# Patient Record
Sex: MALE | Race: WHITE | NOT HISPANIC OR LATINO | Employment: OTHER | RURAL
[De-identification: names, ages, dates, MRNs, and addresses within clinical notes are randomized per-mention and may not be internally consistent; named-entity substitution may affect disease eponyms.]

---

## 2020-01-16 LAB — CRC RECOMMENDATION EXT: NORMAL

## 2020-07-02 ENCOUNTER — HISTORICAL (OUTPATIENT)
Dept: ADMINISTRATIVE | Facility: HOSPITAL | Age: 60
End: 2020-07-02

## 2020-07-02 LAB
CREAT UR-MCNC: 91 MG/DL (ref 39–259)
MICROALBUMIN UR-MCNC: 21.2 MG/DL (ref 0–2.8)
MICROALBUMIN/CREAT RATIO PNL UR: 233 MG/G (ref 0–30)

## 2020-08-27 ENCOUNTER — HISTORICAL (OUTPATIENT)
Dept: ADMINISTRATIVE | Facility: HOSPITAL | Age: 60
End: 2020-08-27

## 2020-11-24 ENCOUNTER — HISTORICAL (OUTPATIENT)
Dept: ADMINISTRATIVE | Facility: HOSPITAL | Age: 60
End: 2020-11-24

## 2020-11-24 LAB
ALBUMIN SERPL BCP-MCNC: 4 G/DL (ref 3.5–5)
ALBUMIN/GLOB SERPL: 1 {RATIO}
ALP SERPL-CCNC: 45 U/L (ref 45–115)
ALT SERPL W P-5'-P-CCNC: 35 U/L (ref 16–61)
ANION GAP SERPL CALCULATED.3IONS-SCNC: 10.4 MMOL/L (ref 7–16)
AST SERPL W P-5'-P-CCNC: 25 U/L (ref 15–37)
BILIRUB SERPL-MCNC: 0.3 MG/DL (ref 0–1.2)
BUN SERPL-MCNC: 22 MG/DL (ref 7–18)
BUN/CREAT SERPL: 20
CALCIUM SERPL-MCNC: 9.7 MG/DL (ref 8.5–10.1)
CHLORIDE SERPL-SCNC: 105 MMOL/L (ref 98–107)
CHOLEST SERPL-MCNC: 93 MG/DL
CHOLEST/HDLC SERPL: 2.7 {RATIO}
CO2 SERPL-SCNC: 27 MMOL/L (ref 21–32)
CREAT SERPL-MCNC: 1.09 MG/DL (ref 0.7–1.3)
GLOBULIN SER-MCNC: 3.9 G/DL (ref 2–4)
GLUCOSE SERPL-MCNC: 124 MG/DL (ref 74–106)
HDLC SERPL-MCNC: 35 MG/DL
LDLC SERPL CALC-MCNC: 29 MG/DL
POTASSIUM SERPL-SCNC: 4.4 MMOL/L (ref 3.5–5.1)
PROT SERPL-MCNC: 7.9 G/DL (ref 6.4–8.2)
SODIUM SERPL-SCNC: 138 MMOL/L (ref 136–145)
T4 FREE SERPL-MCNC: 1.05 NG/DL (ref 0.76–1.46)
TRIGL SERPL-MCNC: 147 MG/DL
TSH SERPL DL<=0.005 MIU/L-ACNC: 2.87 UIU/ML (ref 0.36–3.74)

## 2021-03-31 ENCOUNTER — OFFICE VISIT (OUTPATIENT)
Dept: FAMILY MEDICINE | Facility: CLINIC | Age: 61
End: 2021-03-31
Payer: MEDICARE

## 2021-03-31 VITALS
HEART RATE: 79 BPM | RESPIRATION RATE: 16 BRPM | OXYGEN SATURATION: 99 % | SYSTOLIC BLOOD PRESSURE: 134 MMHG | DIASTOLIC BLOOD PRESSURE: 84 MMHG | WEIGHT: 207.38 LBS | HEIGHT: 67 IN | BODY MASS INDEX: 32.55 KG/M2 | TEMPERATURE: 98 F

## 2021-03-31 DIAGNOSIS — E11.621 DIABETIC ULCER OF LEFT GREAT TOE: ICD-10-CM

## 2021-03-31 DIAGNOSIS — E78.5 HYPERLIPIDEMIA, UNSPECIFIED HYPERLIPIDEMIA TYPE: Primary | ICD-10-CM

## 2021-03-31 DIAGNOSIS — I10 HYPERTENSION, UNSPECIFIED TYPE: ICD-10-CM

## 2021-03-31 DIAGNOSIS — L97.529 DIABETIC ULCER OF LEFT GREAT TOE: ICD-10-CM

## 2021-03-31 DIAGNOSIS — Z79.4 TYPE 2 DIABETES MELLITUS WITHOUT COMPLICATION, WITH LONG-TERM CURRENT USE OF INSULIN: ICD-10-CM

## 2021-03-31 DIAGNOSIS — E11.9 TYPE 2 DIABETES MELLITUS WITHOUT COMPLICATION, WITH LONG-TERM CURRENT USE OF INSULIN: ICD-10-CM

## 2021-03-31 DIAGNOSIS — K21.9 GASTROESOPHAGEAL REFLUX DISEASE WITHOUT ESOPHAGITIS: ICD-10-CM

## 2021-03-31 PROBLEM — E11.41 DIABETIC MONONEUROPATHY ASSOCIATED WITH TYPE 2 DIABETES MELLITUS: Status: ACTIVE | Noted: 2018-08-31

## 2021-03-31 PROBLEM — I25.10 ARTERIOSCLEROTIC CARDIOVASCULAR DISEASE (ASCVD): Status: ACTIVE | Noted: 2020-02-24

## 2021-03-31 PROBLEM — E11.65 TYPE 2 DIABETES MELLITUS WITH HYPERGLYCEMIA, WITH LONG-TERM CURRENT USE OF INSULIN: Status: ACTIVE | Noted: 2018-08-31

## 2021-03-31 PROBLEM — L84 PRE-ULCERATIVE CALLUSES: Status: ACTIVE | Noted: 2020-06-24

## 2021-03-31 PROCEDURE — 3079F PR MOST RECENT DIASTOLIC BLOOD PRESSURE 80-89 MM HG: ICD-10-PCS | Mod: CPTII,,, | Performed by: NURSE PRACTITIONER

## 2021-03-31 PROCEDURE — 3075F PR MOST RECENT SYSTOLIC BLOOD PRESS GE 130-139MM HG: ICD-10-PCS | Mod: CPTII,,, | Performed by: NURSE PRACTITIONER

## 2021-03-31 PROCEDURE — 99214 OFFICE O/P EST MOD 30 MIN: CPT | Mod: ,,, | Performed by: NURSE PRACTITIONER

## 2021-03-31 PROCEDURE — 3075F SYST BP GE 130 - 139MM HG: CPT | Mod: CPTII,,, | Performed by: NURSE PRACTITIONER

## 2021-03-31 PROCEDURE — 1126F PR PAIN SEVERITY QUANTIFIED, NO PAIN PRESENT: ICD-10-PCS | Mod: ,,, | Performed by: NURSE PRACTITIONER

## 2021-03-31 PROCEDURE — 3008F PR BODY MASS INDEX (BMI) DOCUMENTED: ICD-10-PCS | Mod: CPTII,,, | Performed by: NURSE PRACTITIONER

## 2021-03-31 PROCEDURE — 99214 PR OFFICE/OUTPT VISIT, EST, LEVL IV, 30-39 MIN: ICD-10-PCS | Mod: ,,, | Performed by: NURSE PRACTITIONER

## 2021-03-31 PROCEDURE — 1126F AMNT PAIN NOTED NONE PRSNT: CPT | Mod: ,,, | Performed by: NURSE PRACTITIONER

## 2021-03-31 PROCEDURE — 3008F BODY MASS INDEX DOCD: CPT | Mod: CPTII,,, | Performed by: NURSE PRACTITIONER

## 2021-03-31 PROCEDURE — 3079F DIAST BP 80-89 MM HG: CPT | Mod: CPTII,,, | Performed by: NURSE PRACTITIONER

## 2021-03-31 RX ORDER — OLMESARTAN MEDOXOMIL 20 MG/1
20 TABLET ORAL DAILY
COMMUNITY
Start: 2021-03-18 | End: 2021-07-01 | Stop reason: SDUPTHER

## 2021-03-31 RX ORDER — ROSUVASTATIN CALCIUM 40 MG/1
40 TABLET, COATED ORAL NIGHTLY
Qty: 90 TABLET | Refills: 1 | Status: SHIPPED | OUTPATIENT
Start: 2021-03-31 | End: 2021-07-01 | Stop reason: SDUPTHER

## 2021-03-31 RX ORDER — ALBUTEROL SULFATE 90 UG/1
2 AEROSOL, METERED RESPIRATORY (INHALATION) 4 TIMES DAILY PRN
COMMUNITY
Start: 2021-03-10 | End: 2021-07-01 | Stop reason: SDUPTHER

## 2021-03-31 RX ORDER — INSULIN ASPART 100 [IU]/ML
14 INJECTION, SOLUTION INTRAVENOUS; SUBCUTANEOUS 3 TIMES DAILY
COMMUNITY
Start: 2020-11-16 | End: 2021-03-31 | Stop reason: SDUPTHER

## 2021-03-31 RX ORDER — INSULIN ASPART 100 [IU]/ML
14 INJECTION, SOLUTION INTRAVENOUS; SUBCUTANEOUS 3 TIMES DAILY
Qty: 21 ML | Refills: 1 | Status: SHIPPED | OUTPATIENT
Start: 2021-03-31 | End: 2021-07-01 | Stop reason: SDUPTHER

## 2021-03-31 RX ORDER — CHLORTHALIDONE 25 MG/1
25 TABLET ORAL EVERY MORNING
Qty: 90 TABLET | Refills: 1 | Status: SHIPPED | OUTPATIENT
Start: 2021-03-31 | End: 2021-07-01 | Stop reason: SDUPTHER

## 2021-03-31 RX ORDER — METOPROLOL TARTRATE 25 MG/1
25 TABLET, FILM COATED ORAL 2 TIMES DAILY
COMMUNITY
Start: 2021-03-03 | End: 2021-07-01 | Stop reason: SDUPTHER

## 2021-03-31 RX ORDER — CLINDAMYCIN HYDROCHLORIDE 300 MG/1
300 CAPSULE ORAL EVERY 8 HOURS
Qty: 30 CAPSULE | Refills: 0 | Status: SHIPPED | OUTPATIENT
Start: 2021-03-31 | End: 2021-04-10

## 2021-03-31 RX ORDER — PEN NEEDLE, DIABETIC 30 GX3/16"
1 NEEDLE, DISPOSABLE MISCELLANEOUS 4 TIMES DAILY
COMMUNITY
Start: 2020-10-26 | End: 2021-07-01 | Stop reason: SDUPTHER

## 2021-03-31 RX ORDER — CHLORTHALIDONE 25 MG/1
25 TABLET ORAL EVERY MORNING
COMMUNITY
Start: 2021-03-26 | End: 2021-03-31 | Stop reason: SDUPTHER

## 2021-03-31 RX ORDER — INSULIN DETEMIR 100 [IU]/ML
80 INJECTION, SOLUTION SUBCUTANEOUS NIGHTLY
COMMUNITY
Start: 2021-03-06 | End: 2021-07-01 | Stop reason: SDUPTHER

## 2021-03-31 RX ORDER — AMLODIPINE BESYLATE 5 MG/1
5 TABLET ORAL 2 TIMES DAILY
COMMUNITY
Start: 2020-05-28 | End: 2021-03-31 | Stop reason: SDUPTHER

## 2021-03-31 RX ORDER — ESOMEPRAZOLE MAGNESIUM 40 MG/1
40 CAPSULE, DELAYED RELEASE ORAL DAILY
COMMUNITY
Start: 2021-01-28 | End: 2021-07-01 | Stop reason: SDUPTHER

## 2021-03-31 RX ORDER — METFORMIN HYDROCHLORIDE 500 MG/1
2 TABLET, EXTENDED RELEASE ORAL 2 TIMES DAILY
COMMUNITY
Start: 2021-03-18 | End: 2021-03-31 | Stop reason: SDUPTHER

## 2021-03-31 RX ORDER — ROSUVASTATIN CALCIUM 40 MG/1
40 TABLET, COATED ORAL NIGHTLY
COMMUNITY
Start: 2020-12-29 | End: 2021-03-31 | Stop reason: SDUPTHER

## 2021-03-31 RX ORDER — TICAGRELOR 90 MG/1
90 TABLET ORAL 2 TIMES DAILY
Qty: 180 TABLET | Refills: 1 | Status: SHIPPED | OUTPATIENT
Start: 2021-03-31 | End: 2021-07-01 | Stop reason: SDUPTHER

## 2021-03-31 RX ORDER — EMPAGLIFLOZIN 10 MG/1
10 TABLET, FILM COATED ORAL EVERY MORNING
COMMUNITY
Start: 2021-03-04 | End: 2023-01-24

## 2021-03-31 RX ORDER — AMLODIPINE BESYLATE 5 MG/1
5 TABLET ORAL 2 TIMES DAILY
Qty: 180 TABLET | Refills: 1 | Status: SHIPPED | OUTPATIENT
Start: 2021-03-31 | End: 2021-07-01 | Stop reason: SDUPTHER

## 2021-03-31 RX ORDER — TICAGRELOR 90 MG/1
90 TABLET ORAL 2 TIMES DAILY
COMMUNITY
Start: 2021-03-03 | End: 2021-03-31 | Stop reason: SDUPTHER

## 2021-03-31 RX ORDER — METFORMIN HYDROCHLORIDE 500 MG/1
1000 TABLET, EXTENDED RELEASE ORAL 2 TIMES DAILY
Qty: 180 TABLET | Refills: 90 | Status: SHIPPED | OUTPATIENT
Start: 2021-03-31 | End: 2021-07-01 | Stop reason: SDUPTHER

## 2021-07-01 ENCOUNTER — OFFICE VISIT (OUTPATIENT)
Dept: FAMILY MEDICINE | Facility: CLINIC | Age: 61
End: 2021-07-01
Payer: MEDICARE

## 2021-07-01 VITALS
DIASTOLIC BLOOD PRESSURE: 77 MMHG | SYSTOLIC BLOOD PRESSURE: 140 MMHG | HEART RATE: 70 BPM | RESPIRATION RATE: 16 BRPM | HEIGHT: 67 IN | BODY MASS INDEX: 33.74 KG/M2 | OXYGEN SATURATION: 96 % | WEIGHT: 215 LBS | TEMPERATURE: 98 F

## 2021-07-01 DIAGNOSIS — I10 HYPERTENSION, UNSPECIFIED TYPE: ICD-10-CM

## 2021-07-01 DIAGNOSIS — Z79.4 TYPE 2 DIABETES MELLITUS WITHOUT COMPLICATION, WITH LONG-TERM CURRENT USE OF INSULIN: ICD-10-CM

## 2021-07-01 DIAGNOSIS — E11.621 DIABETIC ULCER OF LEFT GREAT TOE: ICD-10-CM

## 2021-07-01 DIAGNOSIS — E11.9 TYPE 2 DIABETES MELLITUS WITHOUT COMPLICATION, WITH LONG-TERM CURRENT USE OF INSULIN: ICD-10-CM

## 2021-07-01 DIAGNOSIS — K21.9 GASTROESOPHAGEAL REFLUX DISEASE WITHOUT ESOPHAGITIS: Primary | ICD-10-CM

## 2021-07-01 DIAGNOSIS — L97.529 DIABETIC ULCER OF LEFT GREAT TOE: ICD-10-CM

## 2021-07-01 DIAGNOSIS — E78.5 HYPERLIPIDEMIA, UNSPECIFIED HYPERLIPIDEMIA TYPE: ICD-10-CM

## 2021-07-01 LAB
ALBUMIN SERPL BCP-MCNC: 3.9 G/DL (ref 3.5–5)
ALBUMIN/GLOB SERPL: 1 {RATIO}
ALP SERPL-CCNC: 45 U/L (ref 45–115)
ALT SERPL W P-5'-P-CCNC: 38 U/L (ref 16–61)
ANION GAP SERPL CALCULATED.3IONS-SCNC: 9 MMOL/L (ref 7–16)
AST SERPL W P-5'-P-CCNC: 24 U/L (ref 15–37)
BASOPHILS # BLD AUTO: 0.09 K/UL (ref 0–0.2)
BASOPHILS NFR BLD AUTO: 1.2 % (ref 0–1)
BILIRUB SERPL-MCNC: 0.4 MG/DL (ref 0–1.2)
BUN SERPL-MCNC: 20 MG/DL (ref 7–18)
BUN/CREAT SERPL: 19 (ref 6–20)
CALCIUM SERPL-MCNC: 9.9 MG/DL (ref 8.5–10.1)
CHLORIDE SERPL-SCNC: 105 MMOL/L (ref 98–107)
CO2 SERPL-SCNC: 29 MMOL/L (ref 21–32)
CREAT SERPL-MCNC: 1.08 MG/DL (ref 0.7–1.3)
DIFFERENTIAL METHOD BLD: ABNORMAL
EOSINOPHIL # BLD AUTO: 0.39 K/UL (ref 0–0.5)
EOSINOPHIL NFR BLD AUTO: 5.2 % (ref 1–4)
ERYTHROCYTE [DISTWIDTH] IN BLOOD BY AUTOMATED COUNT: 14.1 % (ref 11.5–14.5)
EST. AVERAGE GLUCOSE BLD GHB EST-MCNC: 144 MG/DL
GLOBULIN SER-MCNC: 3.9 G/DL (ref 2–4)
GLUCOSE SERPL-MCNC: 106 MG/DL (ref 74–106)
HBA1C MFR BLD HPLC: 6.9 % (ref 4.5–6.6)
HCT VFR BLD AUTO: 38 % (ref 40–54)
HGB BLD-MCNC: 12.1 G/DL (ref 13.5–18)
IMM GRANULOCYTES # BLD AUTO: 0.03 K/UL (ref 0–0.04)
IMM GRANULOCYTES NFR BLD: 0.4 % (ref 0–0.4)
LYMPHOCYTES # BLD AUTO: 1.92 K/UL (ref 1–4.8)
LYMPHOCYTES NFR BLD AUTO: 25.6 % (ref 27–41)
MCH RBC QN AUTO: 27.5 PG (ref 27–31)
MCHC RBC AUTO-ENTMCNC: 31.8 G/DL (ref 32–36)
MCV RBC AUTO: 86.4 FL (ref 80–96)
MONOCYTES # BLD AUTO: 0.76 K/UL (ref 0–0.8)
MONOCYTES NFR BLD AUTO: 10.1 % (ref 2–6)
MPC BLD CALC-MCNC: 10.6 FL (ref 9.4–12.4)
NEUTROPHILS # BLD AUTO: 4.3 K/UL (ref 1.8–7.7)
NEUTROPHILS NFR BLD AUTO: 57.5 % (ref 53–65)
NRBC # BLD AUTO: 0 X10E3/UL
NRBC, AUTO (.00): 0 %
PLATELET # BLD AUTO: 250 K/UL (ref 150–400)
POTASSIUM SERPL-SCNC: 3.8 MMOL/L (ref 3.5–5.1)
PROT SERPL-MCNC: 7.8 G/DL (ref 6.4–8.2)
RBC # BLD AUTO: 4.4 M/UL (ref 4.6–6.2)
SODIUM SERPL-SCNC: 139 MMOL/L (ref 136–145)
WBC # BLD AUTO: 7.49 K/UL (ref 4.5–11)

## 2021-07-01 PROCEDURE — 3077F PR MOST RECENT SYSTOLIC BLOOD PRESSURE >= 140 MM HG: ICD-10-PCS | Mod: ,,, | Performed by: NURSE PRACTITIONER

## 2021-07-01 PROCEDURE — 1125F PR PAIN SEVERITY QUANTIFIED, PAIN PRESENT: ICD-10-PCS | Mod: ,,, | Performed by: NURSE PRACTITIONER

## 2021-07-01 PROCEDURE — 99213 OFFICE O/P EST LOW 20 MIN: CPT | Mod: ,,, | Performed by: NURSE PRACTITIONER

## 2021-07-01 PROCEDURE — 83036 HEMOGLOBIN GLYCOSYLATED A1C: CPT | Mod: ,,, | Performed by: CLINICAL MEDICAL LABORATORY

## 2021-07-01 PROCEDURE — 3008F PR BODY MASS INDEX (BMI) DOCUMENTED: ICD-10-PCS | Mod: ,,, | Performed by: NURSE PRACTITIONER

## 2021-07-01 PROCEDURE — 3077F SYST BP >= 140 MM HG: CPT | Mod: ,,, | Performed by: NURSE PRACTITIONER

## 2021-07-01 PROCEDURE — 3051F PR MOST RECENT HEMOGLOBIN A1C LEVEL 7.0 - < 8.0%: ICD-10-PCS | Mod: CPTII,,, | Performed by: NURSE PRACTITIONER

## 2021-07-01 PROCEDURE — 3078F DIAST BP <80 MM HG: CPT | Mod: ,,, | Performed by: NURSE PRACTITIONER

## 2021-07-01 PROCEDURE — 1125F AMNT PAIN NOTED PAIN PRSNT: CPT | Mod: ,,, | Performed by: NURSE PRACTITIONER

## 2021-07-01 PROCEDURE — 99213 PR OFFICE/OUTPT VISIT, EST, LEVL III, 20-29 MIN: ICD-10-PCS | Mod: ,,, | Performed by: NURSE PRACTITIONER

## 2021-07-01 PROCEDURE — 80053 COMPREHENSIVE METABOLIC PANEL: ICD-10-PCS | Mod: ,,, | Performed by: CLINICAL MEDICAL LABORATORY

## 2021-07-01 PROCEDURE — 3008F BODY MASS INDEX DOCD: CPT | Mod: ,,, | Performed by: NURSE PRACTITIONER

## 2021-07-01 PROCEDURE — 85025 CBC WITH DIFFERENTIAL: ICD-10-PCS | Mod: ,,, | Performed by: CLINICAL MEDICAL LABORATORY

## 2021-07-01 PROCEDURE — 3051F HG A1C>EQUAL 7.0%<8.0%: CPT | Mod: CPTII,,, | Performed by: NURSE PRACTITIONER

## 2021-07-01 PROCEDURE — 80053 COMPREHEN METABOLIC PANEL: CPT | Mod: ,,, | Performed by: CLINICAL MEDICAL LABORATORY

## 2021-07-01 PROCEDURE — 85025 COMPLETE CBC W/AUTO DIFF WBC: CPT | Mod: ,,, | Performed by: CLINICAL MEDICAL LABORATORY

## 2021-07-01 PROCEDURE — 3078F PR MOST RECENT DIASTOLIC BLOOD PRESSURE < 80 MM HG: ICD-10-PCS | Mod: ,,, | Performed by: NURSE PRACTITIONER

## 2021-07-01 PROCEDURE — 83036 HEMOGLOBIN A1C: ICD-10-PCS | Mod: ,,, | Performed by: CLINICAL MEDICAL LABORATORY

## 2021-07-01 RX ORDER — CHLORTHALIDONE 25 MG/1
25 TABLET ORAL EVERY MORNING
Qty: 90 TABLET | Refills: 1 | Status: SHIPPED | OUTPATIENT
Start: 2021-07-01 | End: 2023-02-14 | Stop reason: ALTCHOICE

## 2021-07-01 RX ORDER — METOPROLOL TARTRATE 25 MG/1
25 TABLET, FILM COATED ORAL 2 TIMES DAILY
Qty: 180 TABLET | Refills: 1 | Status: SHIPPED | OUTPATIENT
Start: 2021-07-01 | End: 2022-09-15 | Stop reason: SDUPTHER

## 2021-07-01 RX ORDER — ALBUTEROL SULFATE 90 UG/1
2 AEROSOL, METERED RESPIRATORY (INHALATION) 4 TIMES DAILY PRN
Qty: 18 G | Refills: 5 | Status: SHIPPED | OUTPATIENT
Start: 2021-07-01 | End: 2022-09-15 | Stop reason: SDUPTHER

## 2021-07-01 RX ORDER — INSULIN DETEMIR 100 [IU]/ML
80 INJECTION, SOLUTION SUBCUTANEOUS NIGHTLY
Qty: 72 ML | Refills: 1 | Status: SHIPPED | OUTPATIENT
Start: 2021-07-01 | End: 2023-01-24 | Stop reason: SDUPTHER

## 2021-07-01 RX ORDER — PEN NEEDLE, DIABETIC 30 GX3/16"
1 NEEDLE, DISPOSABLE MISCELLANEOUS 4 TIMES DAILY
Qty: 100 EACH | Refills: 1 | Status: SHIPPED | OUTPATIENT
Start: 2021-07-01 | End: 2022-09-15 | Stop reason: SDUPTHER

## 2021-07-01 RX ORDER — DAPAGLIFLOZIN 10 MG/1
10 TABLET, FILM COATED ORAL DAILY
COMMUNITY

## 2021-07-01 RX ORDER — ROSUVASTATIN CALCIUM 40 MG/1
40 TABLET, COATED ORAL NIGHTLY
Qty: 90 TABLET | Refills: 1 | Status: SHIPPED | OUTPATIENT
Start: 2021-07-01 | End: 2022-09-15 | Stop reason: SDUPTHER

## 2021-07-01 RX ORDER — METFORMIN HYDROCHLORIDE 500 MG/1
1000 TABLET, EXTENDED RELEASE ORAL 2 TIMES DAILY
Qty: 180 TABLET | Refills: 1 | Status: SHIPPED | OUTPATIENT
Start: 2021-07-01 | End: 2023-11-15 | Stop reason: SDUPTHER

## 2021-07-01 RX ORDER — AMLODIPINE BESYLATE 5 MG/1
5 TABLET ORAL 2 TIMES DAILY
Qty: 180 TABLET | Refills: 1 | Status: SHIPPED | OUTPATIENT
Start: 2021-07-01 | End: 2022-05-31

## 2021-07-01 RX ORDER — OLMESARTAN MEDOXOMIL 20 MG/1
20 TABLET ORAL DAILY
Qty: 90 TABLET | Refills: 1 | Status: SHIPPED | OUTPATIENT
Start: 2021-07-01 | End: 2023-03-28

## 2021-07-01 RX ORDER — INSULIN ASPART 100 [IU]/ML
16 INJECTION, SOLUTION INTRAVENOUS; SUBCUTANEOUS 3 TIMES DAILY
Qty: 43.2 ML | Refills: 1 | Status: SHIPPED | OUTPATIENT
Start: 2021-07-01 | End: 2023-01-24 | Stop reason: SDUPTHER

## 2021-07-01 RX ORDER — TICAGRELOR 90 MG/1
90 TABLET ORAL 2 TIMES DAILY
Qty: 180 TABLET | Refills: 1 | Status: SHIPPED | OUTPATIENT
Start: 2021-07-01 | End: 2023-01-24

## 2021-07-01 RX ORDER — ESOMEPRAZOLE MAGNESIUM 40 MG/1
40 CAPSULE, DELAYED RELEASE ORAL DAILY
Qty: 90 CAPSULE | Refills: 1 | Status: SHIPPED | OUTPATIENT
Start: 2021-07-01 | End: 2022-02-15

## 2021-07-12 ENCOUNTER — PATIENT MESSAGE (OUTPATIENT)
Dept: FAMILY MEDICINE | Facility: CLINIC | Age: 61
End: 2021-07-12

## 2022-06-03 ENCOUNTER — PATIENT OUTREACH (OUTPATIENT)
Dept: FAMILY MEDICINE | Facility: CLINIC | Age: 62
End: 2022-06-03
Payer: MEDICARE

## 2022-12-15 DIAGNOSIS — E11.9 TYPE 2 DIABETES MELLITUS WITHOUT COMPLICATION, WITH LONG-TERM CURRENT USE OF INSULIN: Primary | ICD-10-CM

## 2022-12-15 DIAGNOSIS — Z79.4 TYPE 2 DIABETES MELLITUS WITHOUT COMPLICATION, WITH LONG-TERM CURRENT USE OF INSULIN: Primary | ICD-10-CM

## 2023-01-17 ENCOUNTER — OFFICE VISIT (OUTPATIENT)
Dept: FAMILY MEDICINE | Facility: CLINIC | Age: 63
End: 2023-01-17
Payer: MEDICARE

## 2023-01-17 VITALS
HEART RATE: 74 BPM | DIASTOLIC BLOOD PRESSURE: 62 MMHG | RESPIRATION RATE: 18 BRPM | HEIGHT: 67 IN | TEMPERATURE: 98 F | SYSTOLIC BLOOD PRESSURE: 118 MMHG | OXYGEN SATURATION: 96 % | WEIGHT: 210.63 LBS | BODY MASS INDEX: 33.06 KG/M2

## 2023-01-17 DIAGNOSIS — M86.9 OSTEOMYELITIS OF LEFT FOOT, UNSPECIFIED TYPE: Primary | ICD-10-CM

## 2023-01-17 DIAGNOSIS — L03.032 CELLULITIS OF GREAT TOE OF LEFT FOOT: ICD-10-CM

## 2023-01-17 DIAGNOSIS — L03.032 CELLULITIS OF GREAT TOE, LEFT: ICD-10-CM

## 2023-01-17 PROCEDURE — 99213 OFFICE O/P EST LOW 20 MIN: CPT | Mod: 25,GC,, | Performed by: FAMILY MEDICINE

## 2023-01-17 PROCEDURE — 99213 PR OFFICE/OUTPT VISIT, EST, LEVL III, 20-29 MIN: ICD-10-PCS | Mod: 25,GC,, | Performed by: FAMILY MEDICINE

## 2023-01-17 PROCEDURE — 3074F SYST BP LT 130 MM HG: CPT | Mod: ,,, | Performed by: FAMILY MEDICINE

## 2023-01-17 PROCEDURE — 3008F PR BODY MASS INDEX (BMI) DOCUMENTED: ICD-10-PCS | Mod: ,,, | Performed by: FAMILY MEDICINE

## 2023-01-17 PROCEDURE — 1159F PR MEDICATION LIST DOCUMENTED IN MEDICAL RECORD: ICD-10-PCS | Mod: ,,, | Performed by: FAMILY MEDICINE

## 2023-01-17 PROCEDURE — 96372 PR INJECTION,THERAP/PROPH/DIAG2ST, IM OR SUBCUT: ICD-10-PCS | Mod: GC,,, | Performed by: FAMILY MEDICINE

## 2023-01-17 PROCEDURE — 1159F MED LIST DOCD IN RCRD: CPT | Mod: ,,, | Performed by: FAMILY MEDICINE

## 2023-01-17 PROCEDURE — 3078F DIAST BP <80 MM HG: CPT | Mod: ,,, | Performed by: FAMILY MEDICINE

## 2023-01-17 PROCEDURE — 96372 THER/PROPH/DIAG INJ SC/IM: CPT | Mod: GC,,, | Performed by: FAMILY MEDICINE

## 2023-01-17 PROCEDURE — 3074F PR MOST RECENT SYSTOLIC BLOOD PRESSURE < 130 MM HG: ICD-10-PCS | Mod: ,,, | Performed by: FAMILY MEDICINE

## 2023-01-17 PROCEDURE — 3008F BODY MASS INDEX DOCD: CPT | Mod: ,,, | Performed by: FAMILY MEDICINE

## 2023-01-17 PROCEDURE — 3078F PR MOST RECENT DIASTOLIC BLOOD PRESSURE < 80 MM HG: ICD-10-PCS | Mod: ,,, | Performed by: FAMILY MEDICINE

## 2023-01-17 RX ORDER — LEVOFLOXACIN 5 MG/ML
750 INJECTION, SOLUTION INTRAVENOUS DAILY
Qty: 1050 ML | Refills: 0
Start: 2023-01-17 | End: 2023-01-24

## 2023-01-17 RX ORDER — DAPTOMYCIN 50 MG/ML
6 INJECTION, POWDER, LYOPHILIZED, FOR SOLUTION INTRAVENOUS DAILY
Qty: 8.05 EACH | Refills: 0
Start: 2023-01-17 | End: 2023-01-24

## 2023-01-17 RX ORDER — LEVOFLOXACIN 5 MG/ML
750 INJECTION, SOLUTION INTRAVENOUS DAILY
Qty: 1050 ML | Refills: 0 | Status: SHIPPED | OUTPATIENT
Start: 2023-01-17 | End: 2023-01-17

## 2023-01-17 RX ORDER — CEFTRIAXONE 1 G/1
2 INJECTION, POWDER, FOR SOLUTION INTRAMUSCULAR; INTRAVENOUS
Status: DISCONTINUED | OUTPATIENT
Start: 2023-01-17 | End: 2023-01-17

## 2023-01-17 RX ORDER — NITROGLYCERIN 0.4 MG/1
1 TABLET SUBLINGUAL EVERY 5 MIN PRN
COMMUNITY
Start: 2022-12-22

## 2023-01-17 RX ORDER — CEFTRIAXONE 1 G/1
1 INJECTION, POWDER, FOR SOLUTION INTRAMUSCULAR; INTRAVENOUS
Status: COMPLETED | OUTPATIENT
Start: 2023-01-17 | End: 2023-01-17

## 2023-01-17 RX ORDER — TIZANIDINE 4 MG/1
TABLET ORAL
COMMUNITY

## 2023-01-17 RX ORDER — NEBIVOLOL 10 MG/1
10 TABLET ORAL
COMMUNITY
Start: 2022-12-22

## 2023-01-17 RX ORDER — KETOROLAC TROMETHAMINE 30 MG/ML
2 INJECTION, SOLUTION INTRAMUSCULAR; INTRAVENOUS
COMMUNITY
End: 2023-02-08 | Stop reason: ALTCHOICE

## 2023-01-17 RX ORDER — EZETIMIBE 10 MG/1
10 TABLET ORAL
COMMUNITY
Start: 2022-12-22

## 2023-01-17 RX ORDER — DAPTOMYCIN 50 MG/ML
6 INJECTION, POWDER, LYOPHILIZED, FOR SOLUTION INTRAVENOUS DAILY
Qty: 8.05 EACH | Refills: 0 | Status: SHIPPED | OUTPATIENT
Start: 2023-01-17 | End: 2023-01-17

## 2023-01-17 RX ORDER — SILVER SULFADIAZINE 10 G/1000G
CREAM TOPICAL
COMMUNITY
End: 2023-02-21

## 2023-01-17 RX ORDER — PEN NEEDLE, DIABETIC 29 G X1/2"
NEEDLE, DISPOSABLE MISCELLANEOUS
COMMUNITY

## 2023-01-17 RX ORDER — PEN NEEDLE, DIABETIC 30 GX3/16"
NEEDLE, DISPOSABLE MISCELLANEOUS
COMMUNITY
End: 2023-11-15

## 2023-01-17 RX ORDER — L. ACIDOPHILUS/L.BULGARICUS 1MM CELL
4 TABLET ORAL
Qty: 360 TABLET | Refills: 0 | Status: SHIPPED | OUTPATIENT
Start: 2023-01-17 | End: 2023-01-24 | Stop reason: SDUPTHER

## 2023-01-17 RX ADMIN — CEFTRIAXONE 1 G: 1 INJECTION, POWDER, FOR SOLUTION INTRAMUSCULAR; INTRAVENOUS at 04:01

## 2023-01-17 NOTE — PROGRESS NOTES
Subjective:       Patient ID: Brendon Norris is a 62 y.o. male.    Chief Complaint: Foot Swelling and toe swelling (X 1 week (left toe))    Patient is a 62 year old  male who presents with LT 1st toe and left foot cellulitis. The patient states that this has been a recurring problem. He states that he has had worsening erythema and swelling of the left foot for 1 week. He has been on antibiotics in the past for diabetic wound infections on his feet. He has not seen a podiatrist in the past. He states that at one point, he went to a physician who took an x-ray of his LT foot and demonstrated a broken 1st digit of the LT foot (it was never surgically repaired). The patient states that he has no sensation in the the LT first digit. He states that there was an area of fluctuance that ruptured last night.     The patient has a PMH of DM, HTN, and CAD with placement of 2 stents. The patient has NKDA. His last HBA1c was 8.4%.   Review of Systems   Constitutional:  Negative for chills, fatigue and fever.   HENT:  Negative for nasal congestion, sinus pressure/congestion and sore throat.    Respiratory:  Negative for cough, chest tightness, shortness of breath and wheezing.    Cardiovascular:  Negative for chest pain.   Gastrointestinal:  Negative for abdominal pain, diarrhea, nausea and vomiting.   Genitourinary:  Negative for dysuria.   Integumentary:  Positive for color change and wound.   Neurological:  Negative for dizziness, weakness and headaches.       Objective:      Physical Exam  Constitutional:       General: He is not in acute distress.     Appearance: Normal appearance.   HENT:      Head: Normocephalic and atraumatic.      Right Ear: External ear normal.      Left Ear: External ear normal.      Nose: Nose normal.      Mouth/Throat:      Mouth: Mucous membranes are moist.   Eyes:      Extraocular Movements: Extraocular movements intact.      Conjunctiva/sclera: Conjunctivae normal.      Pupils: Pupils  are equal, round, and reactive to light.   Cardiovascular:      Rate and Rhythm: Normal rate and regular rhythm.      Pulses:           Dorsalis pedis pulses are 1+ on the right side and 1+ on the left side.        Posterior tibial pulses are 1+ on the right side and 1+ on the left side.      Heart sounds: Normal heart sounds.   Pulmonary:      Effort: Pulmonary effort is normal.      Breath sounds: Normal breath sounds.   Abdominal:      General: Bowel sounds are normal.      Palpations: Abdomen is soft.   Musculoskeletal:         General: Normal range of motion.      Cervical back: Normal range of motion.      Left foot: Swelling and tenderness present.   Feet:      Right foot:      Toenail Condition: Right toenails are abnormally thick.      Left foot:      Skin integrity: Erythema, warmth and dry skin present.      Toenail Condition: Left toenails are abnormally thick.   Skin:     General: Skin is warm.      Comments: Erythema and broken skin noted on the LT 1st digit     Cellulitis of LT foot and calf        Neurological:      General: No focal deficit present.      Mental Status: He is alert and oriented to person, place, and time.   Psychiatric:         Mood and Affect: Mood normal.         Behavior: Behavior normal.         Thought Content: Thought content normal.         Judgment: Judgment normal.       Assessment:       Problem List Items Addressed This Visit          ID    Cellulitis of great toe, left     - PICC Line referral   - Daptomycin 6mg/kg q24hrs for 7 days (to cover for MRSA)   - Levofloxacin 750mg IV q24hrs for 7 days (to cover for Pseudomonas)   - Home Health referral for IV antibiotics  - CBC, ESR, and CRP via home health   - X-ray of the LT foot pending   - Lactobacillus daily to prevent C-diff  - Ceftriaxone 2g IM in clinic   - Follow-up in 1 week           Other Visit Diagnoses       Cellulitis of great toe of left foot    -  Primary    Relevant Medications    Lactobacillus  acidoph-L.bulgar (FLORANEX) 1 million cell Tab    DAPTOmycin (CUBICIN) 500 mg injection    levoFLOXacin (LEVAQUIN) 750 mg/150 mL PgBk    Other Relevant Orders    X-Ray Foot 2 View Left    Outpatient PICC Insertion    X-Ray Chest 1 View S/P PICC Line by Nursing    PICC catheter may be used for therapy after catheter placements and confirmed by CXR    Anesthesia US Guide Vascular Access    Ambulatory referral/consult to Home Health              Plan:       Cellulitis of great toe of left foot  -     X-Ray Foot 2 View Left; Future; Expected date: 01/17/2023  -     Discontinue: cefTRIAXone injection 2 g  -     Outpatient PICC Insertion; Future  -     X-Ray Chest 1 View S/P PICC Line by Nursing; Future; Expected date: 01/17/2023  -     PICC catheter may be used for therapy after catheter placements and confirmed by CXR  -     Anesthesia US Guide Vascular Access  -     Ambulatory referral/consult to Home Health; Future; Expected date: 01/18/2023  -     Lactobacillus acidoph-L.bulgar (FLORANEX) 1 million cell Tab; Take 4 tablets by mouth 3 (three) times daily with meals.  Dispense: 360 tablet; Refill: 0  -     Discontinue: DAPTOmycin (CUBICIN) 500 mg injection; Inject 575 mg into the vein once daily. for 7 days  Dispense: 8.05 each; Refill: 0  -     Discontinue: levoFLOXacin (LEVAQUIN) 750 mg/150 mL PgBk; Inject 150 mLs (750 mg total) into the vein once daily. for 7 days  Dispense: 1050 mL; Refill: 0  -     DAPTOmycin (CUBICIN) 500 mg injection; Inject 575 mg into the vein once daily. for 7 days  Dispense: 8.05 each; Refill: 0  -     levoFLOXacin (LEVAQUIN) 750 mg/150 mL PgBk; Inject 150 mLs (750 mg total) into the vein once daily. for 7 days  Dispense: 1050 mL; Refill: 0    Cellulitis of great toe, left

## 2023-01-17 NOTE — ASSESSMENT & PLAN NOTE
- PICC Line referral   - Daptomycin 6mg/kg q24hrs for 7 days (to cover for MRSA)   - Levofloxacin 750mg IV q24hrs for 7 days (to cover for Pseudomonas)   - Home Health referral for IV antibiotics  - CBC, ESR, and CRP via home health   - X-ray of the LT foot pending   - Lactobacillus daily to prevent C-diff  - Ceftriaxone 2g IM in clinic   - Follow-up in 1 week

## 2023-01-18 NOTE — PROGRESS NOTES
I have reviewed the notes, assessments, and/or procedures performed by Dr. Armstrong, I concur with her documentation of Brendon Norris.

## 2023-01-19 ENCOUNTER — HOSPITAL ENCOUNTER (OUTPATIENT)
Dept: RADIOLOGY | Facility: HOSPITAL | Age: 63
Discharge: HOME | End: 2023-01-19
Attending: FAMILY MEDICINE
Payer: MEDICARE

## 2023-01-19 DIAGNOSIS — L03.032 CELLULITIS OF GREAT TOE, LEFT: ICD-10-CM

## 2023-01-19 DIAGNOSIS — L03.032 CELLULITIS OF GREAT TOE OF LEFT FOOT: ICD-10-CM

## 2023-01-19 PROCEDURE — 27000525 IR PICC LINE PLACEMENT W/O PORT, W/IMG > 5 Y/O

## 2023-01-19 PROCEDURE — 76937 US GUIDE VASCULAR ACCESS: CPT | Mod: TC

## 2023-01-19 NOTE — PROGRESS NOTES
PICC insertion  Performed by A Kaleigh A  Consent obtained for PICC line insertion.  A formal timeout was called all staff present agree patient procedure.  The left upper extremity was prepped with ChloraPrep and sterile field was established.  1% lidocaine was used as local anesthetic.  Under ultrasound guidance the basilic vein was localized and accessed with a micropuncture needle.  An 018 guidewire was passed through the vein to the level superior vena cava.  A 48 cm PICC line was then placed over the wire with its tip terminating at the atrial caval junction.  The wire was removed both ports were flushed with heparinized saline.  The PICC line was then cleaned and secured.  The patient tolerated the procedure well and there were no immediate postprocedure complications.  Total fluoroscopy time was 12 seconds.

## 2023-01-24 ENCOUNTER — OFFICE VISIT (OUTPATIENT)
Dept: FAMILY MEDICINE | Facility: CLINIC | Age: 63
End: 2023-01-24
Payer: MEDICARE

## 2023-01-24 VITALS
BODY MASS INDEX: 32.99 KG/M2 | SYSTOLIC BLOOD PRESSURE: 127 MMHG | HEART RATE: 75 BPM | HEIGHT: 67 IN | TEMPERATURE: 98 F | DIASTOLIC BLOOD PRESSURE: 72 MMHG | RESPIRATION RATE: 19 BRPM | WEIGHT: 210.19 LBS | OXYGEN SATURATION: 95 %

## 2023-01-24 DIAGNOSIS — Z79.4 TYPE 2 DIABETES MELLITUS WITHOUT COMPLICATION, WITH LONG-TERM CURRENT USE OF INSULIN: ICD-10-CM

## 2023-01-24 DIAGNOSIS — E11.65 TYPE 2 DIABETES MELLITUS WITH HYPERGLYCEMIA, WITH LONG-TERM CURRENT USE OF INSULIN: ICD-10-CM

## 2023-01-24 DIAGNOSIS — Z79.4 TYPE 2 DIABETES MELLITUS WITH HYPERGLYCEMIA, WITH LONG-TERM CURRENT USE OF INSULIN: ICD-10-CM

## 2023-01-24 DIAGNOSIS — B35.1 TOENAIL FUNGUS: ICD-10-CM

## 2023-01-24 DIAGNOSIS — E11.9 TYPE 2 DIABETES MELLITUS WITHOUT COMPLICATION, WITH LONG-TERM CURRENT USE OF INSULIN: ICD-10-CM

## 2023-01-24 DIAGNOSIS — M86.9 OSTEOMYELITIS OF LEFT FOOT, UNSPECIFIED TYPE: ICD-10-CM

## 2023-01-24 DIAGNOSIS — M86.172 OSTEOMYELITIS OF FOOT, LEFT, ACUTE: Primary | ICD-10-CM

## 2023-01-24 PROCEDURE — 1159F MED LIST DOCD IN RCRD: CPT | Mod: ,,, | Performed by: FAMILY MEDICINE

## 2023-01-24 PROCEDURE — 3008F PR BODY MASS INDEX (BMI) DOCUMENTED: ICD-10-PCS | Mod: ,,, | Performed by: FAMILY MEDICINE

## 2023-01-24 PROCEDURE — 99213 OFFICE O/P EST LOW 20 MIN: CPT | Mod: ,,, | Performed by: FAMILY MEDICINE

## 2023-01-24 PROCEDURE — 3078F PR MOST RECENT DIASTOLIC BLOOD PRESSURE < 80 MM HG: ICD-10-PCS | Mod: ,,, | Performed by: FAMILY MEDICINE

## 2023-01-24 PROCEDURE — 3074F PR MOST RECENT SYSTOLIC BLOOD PRESSURE < 130 MM HG: ICD-10-PCS | Mod: ,,, | Performed by: FAMILY MEDICINE

## 2023-01-24 PROCEDURE — 99213 PR OFFICE/OUTPT VISIT, EST, LEVL III, 20-29 MIN: ICD-10-PCS | Mod: ,,, | Performed by: FAMILY MEDICINE

## 2023-01-24 PROCEDURE — 3008F BODY MASS INDEX DOCD: CPT | Mod: ,,, | Performed by: FAMILY MEDICINE

## 2023-01-24 PROCEDURE — 3078F DIAST BP <80 MM HG: CPT | Mod: ,,, | Performed by: FAMILY MEDICINE

## 2023-01-24 PROCEDURE — 3074F SYST BP LT 130 MM HG: CPT | Mod: ,,, | Performed by: FAMILY MEDICINE

## 2023-01-24 PROCEDURE — 1159F PR MEDICATION LIST DOCUMENTED IN MEDICAL RECORD: ICD-10-PCS | Mod: ,,, | Performed by: FAMILY MEDICINE

## 2023-01-24 RX ORDER — LEVOFLOXACIN 750 MG/1
750 TABLET ORAL DAILY
Qty: 35 TABLET | Refills: 0 | Status: SHIPPED | OUTPATIENT
Start: 2023-01-24 | End: 2023-02-28 | Stop reason: SDUPTHER

## 2023-01-24 RX ORDER — INSULIN DETEMIR 100 [IU]/ML
80 INJECTION, SOLUTION SUBCUTANEOUS NIGHTLY
Qty: 72 ML | Refills: 1 | Status: SHIPPED | OUTPATIENT
Start: 2023-01-24 | End: 2023-02-08 | Stop reason: ALTCHOICE

## 2023-01-24 RX ORDER — INSULIN ASPART 100 [IU]/ML
16 INJECTION, SOLUTION INTRAVENOUS; SUBCUTANEOUS 3 TIMES DAILY
Qty: 43.2 ML | Refills: 1 | Status: SHIPPED | OUTPATIENT
Start: 2023-01-24 | End: 2023-07-23

## 2023-01-24 RX ORDER — DAPTOMYCIN 50 MG/ML
6 INJECTION, POWDER, LYOPHILIZED, FOR SOLUTION INTRAVENOUS DAILY
Start: 2023-01-24 | End: 2023-02-28 | Stop reason: SDUPTHER

## 2023-01-24 NOTE — PROGRESS NOTES
I have reviewed the notes, assessments, and/or procedures performed by , I concur with her documentation of Brendon Norris. He feels foot wound is improving,feeding chickens [place] [address] [place]. he denies any streaking itching or new odors.   Did not fill his rx for bacid.   DR Armstrong explained the importance of using such a medication to prevent c diff since he is on long term antibiotics.  Puja Tolbert

## 2023-01-24 NOTE — PROGRESS NOTES
Subjective:       Patient ID: Brendon Norris is a 62 y.o. male.    Chief Complaint: Follow-up (Cellulitis of left toe)    Patient is a 62 year old  male who presents for a follow-up for LT great toe osteomyelitis and cellulitis. The patient is current with Home Health and they are administering IV Daptomycin and IV Levofloxacin through a PICC line in the LT antecubital fossa. The patient does not complain of fever or chills. He has the great toe wrapped. A wound care consult has been placed for possible hyperbaric oxygen treatment and wound dressings. 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 The patient's wife is at bedside during the visit. 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 He denies any pain. The patient will need 5 more weeks of IV antibiotics.   Review of Systems   Constitutional:  Negative for chills, fatigue and fever.   HENT:  Negative for nasal congestion, sinus pressure/congestion and sore throat.    Respiratory:  Negative for cough, chest tightness, shortness of breath and wheezing.    Cardiovascular:  Negative for chest pain.   Gastrointestinal:  Negative for abdominal pain, diarrhea, nausea and vomiting.   Genitourinary:  Negative for dysuria.   Integumentary:  Positive for color change and wound.   Neurological:  Negative for dizziness, weakness and headaches.       Objective:      Physical Exam  Constitutional:       General: He is not in acute distress.     Appearance: Normal appearance.   HENT:      Head: Normocephalic and atraumatic.      Right Ear: External ear normal.      Left Ear: External ear normal.      Nose: Nose normal.      Mouth/Throat:      Mouth: Mucous membranes are moist.   Eyes:      Extraocular Movements: Extraocular movements intact.      Conjunctiva/sclera: Conjunctivae normal.       Pupils: Pupils are equal, round, and reactive to light.   Cardiovascular:      Rate and Rhythm: Normal rate and regular rhythm.      Pulses:           Dorsalis pedis pulses are 1+ on the right side and 1+ on the left side.        Posterior tibial pulses are 1+ on the right side and 1+ on the left side.      Heart sounds: Normal heart sounds.   Pulmonary:      Effort: Pulmonary effort is normal.      Breath sounds: Normal breath sounds.   Abdominal:      General: Bowel sounds are normal.      Palpations: Abdomen is soft.   Musculoskeletal:         General: Normal range of motion.        Arms:       Cervical back: Normal range of motion.      Left foot: Swelling and tenderness present.      Comments: PICC line present; no infiltration, erythema, or swelling noted   Feet:      Right foot:      Toenail Condition: Right toenails are abnormally thick.      Left foot:      Skin integrity: Erythema, warmth and dry skin present.      Toenail Condition: Left toenails are abnormally thick.   Skin:     General: Skin is warm.      Findings: Erythema and lesion present.      Comments: Erythema and broken skin noted on the LT 1st digit (plantar surface)    Cellulitis of LT first toe    Erythema is much improved from last time    Pictures in media       Neurological:      General: No focal deficit present.      Mental Status: He is alert and oriented to person, place, and time.   Psychiatric:         Mood and Affect: Mood normal.         Behavior: Behavior normal.         Thought Content: Thought content normal.         Judgment: Judgment normal.       Assessment:       Problem List Items Addressed This Visit          Derm    Toenail fungus     - Patient will need to be treated after osteomyelitis and cellulitis is resolved            ID    Osteomyelitis of left foot     - X-ray of LT foot: Osteomyelitis of the proximal internal phalange of the great toe  - Continue IV Daptomycin for 6 weeks   - Continue Levofloxacin PO for 6 weeks  (same bioavailability as IV) (per ID)   - Wound care consult placed for dressings and hyperbaric oxygen therapy.   - Home Health to be updated regarding weekly CBC, ESR, and CRP            Endocrine    Type 2 diabetes mellitus with hyperglycemia, with long-term current use of insulin     - Patient's [facility] has been unable to fill Novolog and Levemir lately, will reach out to [facility] in [place], [place] regarding prescription   - Good glycemic control important for wound healing.          Other Visit Diagnoses       Osteomyelitis of foot, left, acute    -  Primary    Type 2 diabetes mellitus without complication, with long-term current use of insulin                  Plan:       Osteomyelitis of foot, left, acute    Osteomyelitis of left foot, unspecified type    Type 2 diabetes mellitus with hyperglycemia, with long-term current use of insulin    Type 2 diabetes mellitus without complication, with long-term current use of insulin

## 2023-01-24 NOTE — ASSESSMENT & PLAN NOTE
- X-ray of LT foot: Osteomyelitis of the proximal internal phalange of the great toe  - Continue IV Daptomycin for 6 weeks   - Continue Levofloxacin PO for 6 weeks (same bioavailability as IV) (per ID)   - Wound care consult placed for dressings and hyperbaric oxygen therapy.   - Home Health to be updated regarding weekly CBC, ESR, and CRP

## 2023-01-24 NOTE — ASSESSMENT & PLAN NOTE
- Patient's [facility] pharmacy has been unable to fill Novolog and Levemir lately, will reach out to [facility] in [place], [place] regarding prescription   - Good glycemic control important for wound healing.

## 2023-02-07 ENCOUNTER — OFFICE VISIT (OUTPATIENT)
Dept: FAMILY MEDICINE | Facility: CLINIC | Age: 63
End: 2023-02-07
Payer: MEDICARE

## 2023-02-07 DIAGNOSIS — M86.9 OSTEOMYELITIS OF LEFT FOOT, UNSPECIFIED TYPE: ICD-10-CM

## 2023-02-07 PROCEDURE — 99212 PR OFFICE/OUTPT VISIT, EST, LEVL II, 10-19 MIN: ICD-10-PCS | Mod: ,,, | Performed by: HOSPITALIST

## 2023-02-07 PROCEDURE — 3044F PR MOST RECENT HEMOGLOBIN A1C LEVEL <7.0%: ICD-10-PCS | Mod: ,,, | Performed by: HOSPITALIST

## 2023-02-07 PROCEDURE — 99212 OFFICE O/P EST SF 10 MIN: CPT | Mod: ,,, | Performed by: HOSPITALIST

## 2023-02-07 PROCEDURE — 3044F HG A1C LEVEL LT 7.0%: CPT | Mod: ,,, | Performed by: HOSPITALIST

## 2023-02-07 RX ORDER — INSULIN GLARGINE 100 [IU]/ML
INJECTION, SOLUTION SUBCUTANEOUS
COMMUNITY
Start: 2023-01-26 | End: 2023-11-15 | Stop reason: SDUPTHER

## 2023-02-08 ENCOUNTER — OFFICE VISIT (OUTPATIENT)
Dept: WOUND CARE | Facility: CLINIC | Age: 63
End: 2023-02-08
Attending: FAMILY MEDICINE
Payer: MEDICARE

## 2023-02-08 VITALS
RESPIRATION RATE: 20 BRPM | DIASTOLIC BLOOD PRESSURE: 84 MMHG | HEART RATE: 79 BPM | TEMPERATURE: 98 F | SYSTOLIC BLOOD PRESSURE: 175 MMHG

## 2023-02-08 DIAGNOSIS — L03.032 CELLULITIS OF GREAT TOE OF LEFT FOOT: ICD-10-CM

## 2023-02-08 DIAGNOSIS — L97.529 DIABETIC ULCER OF LEFT GREAT TOE: Primary | ICD-10-CM

## 2023-02-08 DIAGNOSIS — E11.621 DIABETIC ULCER OF LEFT GREAT TOE: Primary | ICD-10-CM

## 2023-02-08 DIAGNOSIS — R60.9 EDEMA, UNSPECIFIED TYPE: ICD-10-CM

## 2023-02-08 DIAGNOSIS — M86.9 OSTEOMYELITIS OF LEFT FOOT, UNSPECIFIED TYPE: ICD-10-CM

## 2023-02-08 PROCEDURE — 99499 NO LOS: ICD-10-PCS | Mod: S$PBB,,, | Performed by: NURSE PRACTITIONER

## 2023-02-08 PROCEDURE — 3079F PR MOST RECENT DIASTOLIC BLOOD PRESSURE 80-89 MM HG: ICD-10-PCS | Mod: CPTII,,, | Performed by: NURSE PRACTITIONER

## 2023-02-08 PROCEDURE — 1160F RVW MEDS BY RX/DR IN RCRD: CPT | Mod: CPTII,,, | Performed by: NURSE PRACTITIONER

## 2023-02-08 PROCEDURE — 99499 UNLISTED E&M SERVICE: CPT | Mod: S$PBB,,, | Performed by: NURSE PRACTITIONER

## 2023-02-08 PROCEDURE — 11042 DEBRIDEMENT: ICD-10-PCS | Mod: S$PBB,,, | Performed by: NURSE PRACTITIONER

## 2023-02-08 PROCEDURE — 1160F PR REVIEW ALL MEDS BY PRESCRIBER/CLIN PHARMACIST DOCUMENTED: ICD-10-PCS | Mod: CPTII,,, | Performed by: NURSE PRACTITIONER

## 2023-02-08 PROCEDURE — 1159F PR MEDICATION LIST DOCUMENTED IN MEDICAL RECORD: ICD-10-PCS | Mod: CPTII,,, | Performed by: NURSE PRACTITIONER

## 2023-02-08 PROCEDURE — 99214 OFFICE O/P EST MOD 30 MIN: CPT | Mod: PBBFAC | Performed by: NURSE PRACTITIONER

## 2023-02-08 PROCEDURE — 3079F DIAST BP 80-89 MM HG: CPT | Mod: CPTII,,, | Performed by: NURSE PRACTITIONER

## 2023-02-08 PROCEDURE — 11042 DBRDMT SUBQ TIS 1ST 20SQCM/<: CPT | Mod: PBBFAC | Performed by: NURSE PRACTITIONER

## 2023-02-08 PROCEDURE — 3077F PR MOST RECENT SYSTOLIC BLOOD PRESSURE >= 140 MM HG: ICD-10-PCS | Mod: CPTII,,, | Performed by: NURSE PRACTITIONER

## 2023-02-08 PROCEDURE — 1159F MED LIST DOCD IN RCRD: CPT | Mod: CPTII,,, | Performed by: NURSE PRACTITIONER

## 2023-02-08 PROCEDURE — 3077F SYST BP >= 140 MM HG: CPT | Mod: CPTII,,, | Performed by: NURSE PRACTITIONER

## 2023-02-08 NOTE — PROGRESS NOTES
Debridement Performed for Assessment: Wound# left great toe  Performed By: Provider: Emerita Myrick NP  Assistant: Sherly Epstein RN    Debridement: Surgical    Photo taken post procedure:    Time-Out Taken: Yes  Level: Skin/Subcutaneous Tissue  Post Debridement Measurements  Length: (cm) 0.4  Width: (cm) 1.4  Depth: (cm) 0.4      Area: (cm²) 0.56  Percent Debrided: 100%  Total Area Debrided: (sq cm) 0.56    Tissue and other material debrided:  Adipose, Dermis, Epidermis, Subcutaneous,  Devitalized Tissue Debrided:Biofilm, Slough, Eschar, callus  Instrument: Curette  Bleeding: Moderate  Hemostasis Achieved: Pressure  Procedural Pain: Insensate  Post Procedural Pain: Insensate  Response to Treatment: Procedure was tolerated well    Devitalized materials/tissue Removed  the following was removed during debridement  subcutaneous,      Post Debridement Diagnosis  Post debridement diagnosis  Same as Pre-operative debridement diagnosis, No Complications noted.      Grafts or implants applied  Was a graft or implant applied?  No      Procedure assistant  Procedure assisted by:  Assistant is the same as nurse listed above      Complications related to procedure  Did any complication occure during procedure?  No complications noted during or after procedure.      Specimen  Specimen collect during procedure?  No specimen collected      Anaesthesia:  Anesthesia used  None      Blood Loss:  Blood loss during procedure  less than 5 cc

## 2023-02-08 NOTE — PATIENT INSTRUCTIONS
Home Health to draw CMP at next visit with existing order for labs from patient's PCP.    Left Great Toe: Wash wound with baby shampoo and water or vashe.  Apply aquacel ag to wound bed (cut slightly larger than wound)  Cover with dry gauze. Wrap with khushbu/kerlix. Secure with paper tape.  Change daily and as needed for soilage.     Monitor closely for s/s of infection including fever, chills, increase in pain, odor from wound, and increased redness from foot. Go to ER if any complications develop.   Keep leg elevated and avoid pressure on wound.     Diabetes:  Monitor glucose closely. Check fasting glucose and 2 hours after meals. HgA1C goal <7, fasting glucose , and 2 hours after meals <180  Hypertension:  Check blood pressure twice daily, goal <120/80  Diet:   Increase protein intake, avoid fried, fatty foods and foods high in simple carbs.   Vitamins:  Take vitamin C 1000 mg, zinc 50mg, vitamin d 5000 units, and a daily multivitamin. Prashanth is a good source of protein and nutrients to aid in wound healing.      Keep f/u appointments for imaging and f/u in wound care clinic immediately following.

## 2023-02-09 DIAGNOSIS — Z71.89 COMPLEX CARE COORDINATION: ICD-10-CM

## 2023-02-14 PROBLEM — E11.621 DIABETIC ULCER OF LEFT GREAT TOE: Status: ACTIVE | Noted: 2023-02-14

## 2023-02-14 PROBLEM — L97.529 DIABETIC ULCER OF LEFT GREAT TOE: Status: ACTIVE | Noted: 2023-02-14

## 2023-02-14 NOTE — PROCEDURES
Debridement    Date/Time: 2/8/2023 1:00 PM  Performed by: SHAYNA Chambers  Authorized by: SHAYNA Chambers     Consent Done?:  Yes (Written)    Wound Details:    Location:  Left foot    Location:  Left 1st Toe    Type of Debridement:  Excisional       Length (cm):  0.4       Area (sq cm):  0.56       Width (cm):  1.4       Percent Debrided (%):  100       Depth (cm):  0.4       Total Area Debrided (sq cm):  0.56    Depth of debridement:  Subcutaneous tissue    Tissue debrided:  Adipose, Dermis, Epidermis and Subcutaneous    Devitalized tissue debrided:  Callus, Clots and Exudate    Instruments:  Curette    Bleeding:  Minimal  Hemostasis Achieved: Yes    Method Used:  Pressure  Patient tolerance:  Patient tolerated the procedure well with no immediate complications     Assistant CHITO Epstein RN

## 2023-02-14 NOTE — PROGRESS NOTES
SHAYNA Chambers   [facility]  [address] [facility] [place] [address]  [phone]      PATIENT NAME: Brendon Norris  : 1960  DATE: 23  MRN: [id number]      Billing Provider: SHAYNA Chambers  Level of Service:   Patient PCP Information       Provider PCP Type    Natalie Armstrong MD General            Reason for Visit / Chief Complaint: Non-healing Wound (Left great toe)       History of Present Illness / Problem Focused Workflow     Brendon Norris is a 63 yo male presents to clinic for evaluation of chronic non healing wound to left great toe. He was referred by Dr. Armstrong for evaluation for hyperbarics. Reports that he has had issues with swelling and open wound intermittently for several years. In January, he developed erythema and edema to left great toe. X-ray on , Impression: There is osteolysis involving the proximal internal phalanges of the great toe consistent with osteomyelitis. He is currently on a 6 week course of IV antibiotics. Erythema and edema have improved. Patricia-wound with maceration, bedside debridement today. Repeat x-ray, arterial doppler, and 3 phase bone scan ordered for insurance pre-certification. 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 Discussed in detail importance of wearing off-loading shoe, elevation left lower extremity, and increasing protein, vitamin C, and zinc to aid in wound healing.   Pertinent PMH includes diabetes, hypertension, CAD, and hyperlipedemia. Last HgA1C 8.4 , diabetes is managed by PCP. Wound healing is complicated by hyperglycemia, multiple co-morbidities, poor vascular supply, diabetes, edema, excessive wound moisture and macerated tissue, decreased granulation tissue, no protective sensation, and infection.               Review of Systems     Review of Systems   Constitutional:  Negative for activity change, appetite change and fever.   Cardiovascular:  Positive  for leg swelling. Negative for chest pain and palpitations.   Musculoskeletal:  Positive for arthralgias and joint swelling.   Skin:  Positive for color change and wound.        wound   Neurological:  Positive for numbness.   Psychiatric/Behavioral:  Negative for confusion and decreased concentration.      Medical / Social / Family History     Past Medical History:   Diagnosis Date    Coronary artery disease     Diabetes mellitus, type 2     GERD (gastroesophageal reflux disease)     Hyperlipidemia     Hypertension        History reviewed. No pertinent surgical history.    Social History  . Brendon Norris  reports that he has quit smoking. His smoking use included cigarettes. He has a 1.00 pack-year smoking history. He has never used smokeless tobacco. He reports that he does not currently use alcohol. He reports that he does not currently use drugs.    Family History  Mr.'s Brendon Norris family history includes Learning disabilities in his son.    Medications and Allergies     Medications  Outpatient Medications Marked as Taking for the 2/8/23 encounter (Office Visit) with SHAYNA Chambers   Medication Sig Dispense Refill    albuterol (PROVENTIL/VENTOLIN HFA) 90 mcg/actuation inhaler Inhale 2 puffs into the lungs 4 (four) times daily as needed for Wheezing. 18 g 5    amLODIPine (NORVASC) 5 MG tablet Take 1 tablet by mouth twice daily 180 tablet 1    aspirin-calcium carbonate 81 mg-300 mg calcium(777 mg) Tab Take 81 mg by mouth once daily.      BLOOD-GLUCOSE METER,CONTINUOUS MISC 1 Device by NOT APPLICABLE route.      dapagliflozin (FARXIGA) 10 mg tablet Take 10 mg by mouth once daily.      esomeprazole (NEXIUM) 40 MG capsule Take 1 capsule (40 mg total) by mouth once daily. 90 capsule 0    ezetimibe (ZETIA) 10 mg tablet Take 10 mg by mouth.      insulin aspart U-100 (NOVOLOG FLEXPEN U-100 INSULIN) 100 unit/mL (3 mL) InPn pen Inject 16 Units into the skin 3 (three) times daily. Inject 16  units at breakfast, 24 units at lunch, and 30 units at supper. Plus sliding scale of 2 units/50 > 150. Max total daily dose of 100 units. 43.2 mL 1    Lactobacillus rhamnosus GG (CULTURELLE) 10 billion cell capsule Take 1 capsule by mouth once daily. 60 capsule 0    LANTUS SOLOSTAR U-100 INSULIN glargine 100 units/mL SubQ pen Inject into the skin.      metFORMIN (GLUCOPHAGE-XR) 500 MG ER 24hr tablet Take 2 tablets (1,000 mg total) by mouth 2 (two) times a day. 180 tablet 1    metoprolol tartrate (LOPRESSOR) 25 MG tablet Take 1 tablet (25 mg total) by mouth 2 (two) times daily. 180 tablet 1    nebivoloL (BYSTOLIC) 10 MG Tab Take 10 mg by mouth.      nitroGLYCERIN (NITROSTAT) 0.4 MG SL tablet Place 1 tablet under the tongue every 5 (five) minutes as needed.      olmesartan (BENICAR) 20 MG tablet Take 1 tablet (20 mg total) by mouth once daily. 90 tablet 1    rosuvastatin (CRESTOR) 40 MG Tab Take 1 tablet (40 mg total) by mouth nightly. 90 tablet 1    tiZANidine (ZANAFLEX) 4 MG tablet tizanidine 4 mg tablet   TAKE 1 TABLET BY MOUTH EVERY 6 HOURS AS NEEDED         Allergies  Review of patient's allergies indicates:   Allergen Reactions    Lisinopril      Other reaction(s): Cough       Physical Examination     Vitals:    02/08/23 1312   BP: (!) 175/84   Pulse: 79   Resp: 20   Temp: 97.8 °F (36.6 °C)     Physical Exam  Vitals and nursing note reviewed.   Constitutional:       Appearance: Normal appearance.   HENT:      Head: Normocephalic.   Cardiovascular:      Rate and Rhythm: Normal rate and regular rhythm.      Pulses: Normal pulses.      Heart sounds: Normal heart sounds.   Pulmonary:      Effort: Pulmonary effort is normal. No respiratory distress.   Chest:      Chest wall: No tenderness.   Musculoskeletal:         General: Swelling present. No tenderness.      Left lower leg: Edema present.   Skin:     General: Skin is warm.      Comments: See LDA for photos/measurements   Neurological:      General: No  focal deficit present.      Mental Status: He is alert and oriented to person, place, and time. Mental status is at baseline.   Psychiatric:         Mood and Affect: Mood normal.         Behavior: Behavior normal.         Thought Content: Thought content normal.         Judgment: Judgment normal.       Assessment and Plan             Altered Skin Integrity 02/08/23 1316 Left anterior Toe, first (Active)   02/08/23 1316   Altered Skin Integrity Present on Admission:    Side: Left   Orientation: anterior   Location: Toe, first   Wound Number:    Is this injury device related?:    Primary Wound Type:    Description of Altered Skin Integrity:    Ankle-Brachial Index:    Pulses:    Removal Indication and Assessment:    Wound Outcome:    (Retired) Wound Length (cm):    (Retired) Wound Width (cm):    (Retired) Depth (cm):    Wound Description (Comments):    Removal Indications:    Wound Image   02/08/23 1423   Description of Altered Skin Integrity Full thickness tissue loss. Subcutaneous fat may be visible but bone, tendon or muscle are not exposed 02/08/23 1317   Dressing Appearance Moist drainage 02/08/23 1317   Drainage Amount Moderate 02/08/23 1317   Drainage Characteristics/Odor Serosanguineous;No odor 02/08/23 1317   Appearance Pink;Red;Wet 02/08/23 1317   Tissue loss description Full thickness 02/08/23 1317   Black (%), Wound Tissue Color 0 % 02/08/23 1317   Red (%), Wound Tissue Color 100 % 02/08/23 1317   Yellow (%), Wound Tissue Color 0 % 02/08/23 1317   Periwound Area Macerated;Moist;Pale white 02/08/23 1317   Wound Edges Open 02/08/23 1317   Wound Length (cm) 0.4 cm 02/08/23 1423   Wound Width (cm) 1.4 cm 02/08/23 1423   Wound Depth (cm) 0.4 cm 02/08/23 1423   Wound Volume (cm^3) 0.224 cm^3 02/08/23 1423   Wound Surface Area (cm^2) 0.56 cm^2 02/08/23 1423   Care Cleansed with:;Soap and water;Applied:;Skin Barrier;Debrided 02/08/23 1317   Dressing Applied;Gauze, wet to dry;Gauze;Rolled gauze 02/08/23 1317    Periwound Care Moisture barrier applied 02/08/23 1317   Dressing Change Due 02/09/23 02/08/23 1317     Problem List Items Addressed This Visit          ID    Osteomyelitis of left foot       Endocrine    Diabetic ulcer of left great toe    Overview                      Current Assessment & Plan     Home Health to draw CMP at next visit with existing order for labs from patient's PCP.    Left Great Toe: Wash wound with baby shampoo and water or vashe.  Apply aquacel ag to wound bed (cut slightly larger than wound)  Cover with dry gauze. Wrap with khushbu/kerlix. Secure with paper tape.  Change daily and as needed for soilage.     Monitor closely for s/s of infection including fever, chills, increase in pain, odor from wound, and increased redness from foot. Go to ER if any complications develop.   Keep leg elevated and avoid pressure on wound.     Diabetes:  Monitor glucose closely. Check fasting glucose and 2 hours after meals. HgA1C goal <7, fasting glucose , and 2 hours after meals <180  Hypertension:  Check blood pressure twice daily, goal <120/80  Diet:   Increase protein intake, avoid fried, fatty foods and foods high in simple carbs.   Vitamins:  Take vitamin C 1000 mg, zinc 50mg, vitamin d 5000 units, and a daily multivitamin. Prashanth is a good source of protein and nutrients to aid in wound healing.      Arterial doppler and three phase bone scan scheduled - f/u in clinic to review results and discuss plan of care          Other Visit Diagnoses       Edema, unspecified type    -  Primary    Relevant Orders    US Lower Extrem Arteries Bilat with ISAAC (xpd)    Cellulitis of great toe of left foot        Relevant Orders    NM Bone Scan 3 Phase Foot    X-Ray Foot Complete 3 view Left            Future Appointments   Date Time Provider Department Center   2/21/2023  9:00 AM [facility]  NM2 [facility]   2/21/2023 10:00 AM [facility]  US1 [facility] ULTS [facility]   2/21/2023 11:00 AM [facility]  NM2 [facility]  NM [facility]   2/21/2023  1:00 PM SHAYNA Chambers [facility]   2/28/2023  2:30 PM Natalie Armstrong MD [facility]            Signature:  SHAYNA Chambers  [facility]  [address] [facility] [address]  [phone]    Date of encounter: 2/8/23

## 2023-02-14 NOTE — ASSESSMENT & PLAN NOTE
Home Health to draw CMP at next visit with existing order for labs from patient's PCP.    Left Great Toe: Wash wound with baby shampoo and water or vashe.  Apply aquacel ag to wound bed (cut slightly larger than wound)  Cover with dry gauze. Wrap with khushbu/kerlix. Secure with paper tape.  Change daily and as needed for soilage.     Monitor closely for s/s of infection including fever, chills, increase in pain, odor from wound, and increased redness from foot. Go to ER if any complications develop.   Keep leg elevated and avoid pressure on wound.     Diabetes:  Monitor glucose closely. Check fasting glucose and 2 hours after meals. HgA1C goal <7, fasting glucose , and 2 hours after meals <180  Hypertension:  Check blood pressure twice daily, goal <120/80  Diet:   Increase protein intake, avoid fried, fatty foods and foods high in simple carbs.   Vitamins:  Take vitamin C 1000 mg, zinc 50mg, vitamin d 5000 units, and a daily multivitamin. Prashanth is a good source of protein and nutrients to aid in wound healing.      Arterial doppler and three phase bone scan scheduled - f/u in clinic to review results and discuss plan of care

## 2023-02-20 NOTE — PROGRESS NOTES
SHAYNA Chambers   [facility]  [address] [facility] [place] [address]  [phone]      PATIENT NAME: Brendon Norris  : 1960  DATE: 23  MRN: [id number]      Billing Provider: SHAYNA Chambers  Level of Service:   Patient PCP Information       Provider PCP Type    Natalie Armstrong MD General            Reason for Visit / Chief Complaint: Non-healing Wound Follow Up (Left great toe)       History of Present Illness / Problem Focused Workflow     Brendon Norris is a 61 yo male presents to clinic for for follow up on chronic non healing wound to left great toe. Maceration has significantly improved. He had arterial doppler and bone scan today. Pre certification to [facility] for HBOT.     Arterial doppler, Impression:  The ankle to brachial index is 1.17 on the right and 1.24 on the left.  No arterial occlusions demonstrated.  Mild/moderate arterial velocity elevations as detailed.  Nonspecific moderately prominent lymph node within the left upper thigh.  Follow up with PCP concerning enlarged lymph node for further evaluation    Three Phase Bone Scan - Impression:  Increased activity 1st digit left foot consistent with osteomyelitis.    23 - He was referred by Dr. Armstrong for evaluation for hyperbarics. Reports that he has had issues with swelling and open wound intermittently for several years. In January, he developed erythema and edema to left great toe. X-ray on , Impression: There is osteolysis involving the proximal internal phalanges of the great toe consistent with osteomyelitis. He is currently on a 6 week course of IV antibiotics. Erythema and edema have improved. Patricia-wound with maceration, bedside debridement today. Repeat x-ray, arterial doppler, and 3 phase bone scan ordered for insurance pre-certification. 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 Discussed in detail importance of wearing off-loading shoe,  elevation left lower extremity, and increasing protein, vitamin C, and zinc to aid in wound healing.   Pertinent PMH includes diabetes, hypertension, CAD, and hyperlipedemia. Last HgA1C 8.4 12/22, diabetes is managed by PCP. Wound healing is complicated by hyperglycemia, multiple co-morbidities, poor vascular supply, diabetes, edema, excessive wound moisture and macerated tissue, decreased granulation tissue, no protective sensation, and infection.             Review of Systems     Review of Systems   Constitutional:  Negative for activity change, appetite change and fever.   Cardiovascular:  Positive for leg swelling. Negative for chest pain and palpitations.   Musculoskeletal:  Positive for arthralgias and joint swelling.   Skin:  Positive for color change and wound.        wound   Neurological:  Positive for numbness.   Psychiatric/Behavioral:  Negative for confusion and decreased concentration.      Medical / Social / Family History     Past Medical History:   Diagnosis Date    Coronary artery disease     Diabetes mellitus, type 2     GERD (gastroesophageal reflux disease)     Hyperlipidemia     Hypertension        History reviewed. No pertinent surgical history.    Social History  Mr. Brendon Norris  reports that he has quit smoking. His smoking use included cigarettes. He has a 1.00 pack-year smoking history. He has never used smokeless tobacco. He reports that he does not currently use alcohol. He reports that he does not currently use drugs.    Family History  Mr.'s Brnedon Norris family history includes Learning disabilities in his son.    Medications and Allergies     Medications  Outpatient Medications Marked as Taking for the 2/21/23 encounter (Office Visit) with SHAYNA Chambers   Medication Sig Dispense Refill    albuterol (PROVENTIL/VENTOLIN HFA) 90 mcg/actuation inhaler Inhale 2 puffs into the lungs 4 (four) times daily as needed for Wheezing. 18 g 5    amLODIPine (NORVASC) 5 MG  "tablet Take 1 tablet by mouth twice daily 180 tablet 1    aspirin-calcium carbonate 81 mg-300 mg calcium(777 mg) Tab Take 81 mg by mouth once daily.      BLOOD-GLUCOSE METER,CONTINUOUS MISC 1 Device by NOT APPLICABLE route.      dapagliflozin (FARXIGA) 10 mg tablet Take 10 mg by mouth once daily.      DAPTOmycin (CUBICIN) 500 mg injection Inject 570 mg into the vein once daily.      esomeprazole (NEXIUM) 40 MG capsule Take 1 capsule (40 mg total) by mouth once daily. 90 capsule 0    ezetimibe (ZETIA) 10 mg tablet Take 10 mg by mouth.      FREESTYLE URVASHI 2 READER Misc 1 Device.      FREESTYLE URVASHI 2 SENSOR Kit 1 applicator by subcutaneous (via wearable injector) route every 14 (fourteen) days.      insulin aspart U-100 (NOVOLOG FLEXPEN U-100 INSULIN) 100 unit/mL (3 mL) InPn pen Inject 16 Units into the skin 3 (three) times daily. Inject 16 units at breakfast, 24 units at lunch, and 30 units at supper. Plus sliding scale of 2 units/50 > 150. Max total daily dose of 100 units. 43.2 mL 1    Lactobacillus rhamnosus GG (CULTURELLE) 10 billion cell capsule Take 1 capsule by mouth once daily. 60 capsule 0    LANTUS SOLOSTAR U-100 INSULIN glargine 100 units/mL SubQ pen Inject into the skin.      levoFLOXacin (LEVAQUIN) 750 MG tablet Take 1 tablet (750 mg total) by mouth once daily. 35 tablet 0    metFORMIN (GLUCOPHAGE-XR) 500 MG ER 24hr tablet Take 2 tablets (1,000 mg total) by mouth 2 (two) times a day. 180 tablet 1    metoprolol tartrate (LOPRESSOR) 25 MG tablet Take 1 tablet (25 mg total) by mouth 2 (two) times daily. 180 tablet 1    nebivoloL (BYSTOLIC) 10 MG Tab Take 10 mg by mouth.      nitroGLYCERIN (NITROSTAT) 0.4 MG SL tablet Place 1 tablet under the tongue every 5 (five) minutes as needed.      olmesartan (BENICAR) 20 MG tablet Take 1 tablet (20 mg total) by mouth once daily. 90 tablet 1    pen needle, diabetic 31 gauge x 1/4" Ndle pen needle, diabetic 31 gauge x 1/4"   USE AS DIRECTED 4 TIMES " "DAILY      pen needle, diabetic 31 gauge x 3/16" Ndle BD Ultra-Fine Mini Pen Needle 31 gauge x 3/16"   USE 1 4 TIMES DAILY      pen needle, diabetic 31 gauge x 5/16" Ndle Inject 1 Units into the skin 4 (four) times daily. 100 each 1    rosuvastatin (CRESTOR) 40 MG Tab Take 1 tablet (40 mg total) by mouth nightly. 90 tablet 1    tiZANidine (ZANAFLEX) 4 MG tablet tizanidine 4 mg tablet   TAKE 1 TABLET BY MOUTH EVERY 6 HOURS AS NEEDED      [DISCONTINUED] silver sulfADIAZINE 1% (SILVADENE) 1 % cream SSD 1 % topical cream   APPLY CREAM TO AFFECTED AREA ONCE DAILY         Allergies  Review of patient's allergies indicates:   Allergen Reactions    Lisinopril      Other reaction(s): Cough       Physical Examination     Vitals:    02/21/23 1313   BP: (!) 153/71   Pulse: 79   Resp: 20     Physical Exam  Vitals and nursing note reviewed.   Constitutional:       Appearance: Normal appearance.   HENT:      Head: Normocephalic.   Cardiovascular:      Rate and Rhythm: Normal rate and regular rhythm.      Pulses: Normal pulses.      Heart sounds: Normal heart sounds.   Pulmonary:      Effort: Pulmonary effort is normal. No respiratory distress.   Chest:      Chest wall: No tenderness.   Musculoskeletal:         General: Swelling present. No tenderness.      Left lower leg: Edema present.   Skin:     General: Skin is warm.      Comments: See LDA for photos/measurements   Neurological:      General: No focal deficit present.      Mental Status: He is alert and oriented to person, place, and time. Mental status is at baseline.   Psychiatric:         Mood and Affect: Mood normal.         Behavior: Behavior normal.         Thought Content: Thought content normal.         Judgment: Judgment normal.     Assessment and Plan             Altered Skin Integrity 02/08/23 1316 Left posterior Toe, first (Active)   02/08/23 1316   Altered Skin Integrity Present on Admission:    Side: Left   Orientation: posterior   Location: Toe, first "   Wound Number:    Is this injury device related?:    Primary Wound Type:    Description of Altered Skin Integrity:    Ankle-Brachial Index:    Pulses:    Removal Indication and Assessment:    Wound Outcome:    (Retired) Wound Length (cm):    (Retired) Wound Width (cm):    (Retired) Depth (cm):    Wound Description (Comments):    Removal Indications:    Wound Image    02/21/23 1319   Description of Altered Skin Integrity Partial thickness tissue loss. Shallow open ulcer with a red or pink wound bed, without slough. Intact or Open/Ruptured Serum-filled blister. 02/21/23 1319   Dressing Appearance No dressing 02/21/23 1319   Drainage Amount Scant 02/21/23 1319   Drainage Characteristics/Odor Serous 02/21/23 1319   Appearance Pink;Moist;Epithelialization 02/21/23 1319   Tissue loss description Partial thickness 02/21/23 1319   Black (%), Wound Tissue Color 0 % 02/21/23 1319   Red (%), Wound Tissue Color 100 % 02/21/23 1319   Yellow (%), Wound Tissue Color 0 % 02/21/23 1319   Periwound Area Intact 02/21/23 1319   Wound Edges Open 02/21/23 1319   Wound Length (cm) 0.2 cm 02/21/23 1319   Wound Width (cm) 0.3 cm 02/21/23 1319   Wound Depth (cm) 0.1 cm 02/21/23 1319   Wound Volume (cm^3) 0.006 cm^3 02/21/23 1319   Wound Surface Area (cm^2) 0.06 cm^2 02/21/23 1319   Dressing Applied;Gauze, wet to dry;Gauze 02/21/23 1319   Periwound Care Moisturizer applied 02/21/23 1319   Off Loading Off loading shoe 02/21/23 1319   Dressing Change Due 02/22/23 02/21/23 1319     Problem List Items Addressed This Visit          Endocrine    Diabetic ulcer of left great toe - Primary    Overview                  Current Assessment & Plan     Left Great Toe: Wash wound with baby shampoo and water or vashe.  Apply silvadene to wound  Cover with dry gauze. Wrap with khushbu/kerlix. Secure with paper tape.  Change daily and as needed for soilage.      Monitor closely for s/s of infection including fever, chills, increase in pain, odor from wound,  and increased redness from foot. Go to ER if any complications develop.   Keep leg elevated and avoid pressure on wound.      Diabetes:  Monitor glucose closely. Check fasting glucose and 2 hours after meals. HgA1C goal <7, fasting glucose , and 2 hours after meals <180  Hypertension:  Check blood pressure twice daily, goal <120/80  Diet:   Increase protein intake, avoid fried, fatty foods and foods high in simple carbs.   Vitamins:  Take vitamin C 1000 mg, zinc 50mg, vitamin d 5000 units, and a daily multivitamin. Prashanth is a good source of protein and nutrients to aid in wound healing.              Future Appointments   Date Time Provider Department Center   2/28/2023  2:30 PM Natalie Armstrong MD [facility]   3/14/2023  1:00 PM [facility]            Signature:  SHAYNA Chambers  [facility]  [address] [facility] [place] [address]  [phone]    Date of encounter: 2/21/23

## 2023-02-20 NOTE — PATIENT INSTRUCTIONS
Left Great Toe: Wash wound with baby shampoo and water or vashe.  Apply silvadene to wound  Cover with dry gauze. Wrap with khushbu/kerlix. Secure with paper tape.  Change daily and as needed for soilage.      Monitor closely for s/s of infection including fever, chills, increase in pain, odor from wound, and increased redness from foot. Go to ER if any complications develop.   Keep leg elevated and avoid pressure on wound.      Diabetes:  Monitor glucose closely. Check fasting glucose and 2 hours after meals. HgA1C goal <7, fasting glucose , and 2 hours after meals <180  Hypertension:  Check blood pressure twice daily, goal <120/80  Diet:   Increase protein intake, avoid fried, fatty foods and foods high in simple carbs.   Vitamins:  Take vitamin C 1000 mg, zinc 50mg, vitamin d 5000 units, and a daily multivitamin. Prashanth is a good source of protein and nutrients to aid in wound healing.          normal...

## 2023-02-21 ENCOUNTER — HOSPITAL ENCOUNTER (OUTPATIENT)
Dept: RADIOLOGY | Facility: HOSPITAL | Age: 63
Discharge: HOME | End: 2023-02-21
Attending: NURSE PRACTITIONER
Payer: MEDICARE

## 2023-02-21 ENCOUNTER — OFFICE VISIT (OUTPATIENT)
Dept: WOUND CARE | Facility: CLINIC | Age: 63
End: 2023-02-21
Attending: FAMILY MEDICINE
Payer: MEDICARE

## 2023-02-21 VITALS — DIASTOLIC BLOOD PRESSURE: 71 MMHG | SYSTOLIC BLOOD PRESSURE: 153 MMHG | RESPIRATION RATE: 20 BRPM | HEART RATE: 79 BPM

## 2023-02-21 DIAGNOSIS — L97.529 DIABETIC ULCER OF LEFT GREAT TOE: Primary | ICD-10-CM

## 2023-02-21 DIAGNOSIS — L03.032 CELLULITIS OF GREAT TOE OF LEFT FOOT: ICD-10-CM

## 2023-02-21 DIAGNOSIS — E11.621 DIABETIC ULCER OF LEFT GREAT TOE: Primary | ICD-10-CM

## 2023-02-21 DIAGNOSIS — R60.9 EDEMA, UNSPECIFIED TYPE: ICD-10-CM

## 2023-02-21 PROCEDURE — 1159F PR MEDICATION LIST DOCUMENTED IN MEDICAL RECORD: ICD-10-PCS | Mod: CPTII,,, | Performed by: NURSE PRACTITIONER

## 2023-02-21 PROCEDURE — 3078F DIAST BP <80 MM HG: CPT | Mod: CPTII,,, | Performed by: NURSE PRACTITIONER

## 2023-02-21 PROCEDURE — 99213 PR OFFICE/OUTPT VISIT, EST, LEVL III, 20-29 MIN: ICD-10-PCS | Mod: S$PBB,,, | Performed by: NURSE PRACTITIONER

## 2023-02-21 PROCEDURE — 1159F MED LIST DOCD IN RCRD: CPT | Mod: CPTII,,, | Performed by: NURSE PRACTITIONER

## 2023-02-21 PROCEDURE — 93925 US ARTERIAL LOWER EXTREMITY BILAT WITH ABI (XPD): ICD-10-PCS | Mod: 26,,, | Performed by: RADIOLOGY

## 2023-02-21 PROCEDURE — 1160F RVW MEDS BY RX/DR IN RCRD: CPT | Mod: CPTII,,, | Performed by: NURSE PRACTITIONER

## 2023-02-21 PROCEDURE — 99215 OFFICE O/P EST HI 40 MIN: CPT | Mod: PBBFAC,25 | Performed by: NURSE PRACTITIONER

## 2023-02-21 PROCEDURE — 3077F PR MOST RECENT SYSTOLIC BLOOD PRESSURE >= 140 MM HG: ICD-10-PCS | Mod: CPTII,,, | Performed by: NURSE PRACTITIONER

## 2023-02-21 PROCEDURE — 93925 LOWER EXTREMITY STUDY: CPT | Mod: TC

## 2023-02-21 PROCEDURE — 3078F PR MOST RECENT DIASTOLIC BLOOD PRESSURE < 80 MM HG: ICD-10-PCS | Mod: CPTII,,, | Performed by: NURSE PRACTITIONER

## 2023-02-21 PROCEDURE — 93925 LOWER EXTREMITY STUDY: CPT | Mod: 26,,, | Performed by: RADIOLOGY

## 2023-02-21 PROCEDURE — 99213 OFFICE O/P EST LOW 20 MIN: CPT | Mod: S$PBB,,, | Performed by: NURSE PRACTITIONER

## 2023-02-21 PROCEDURE — 3077F SYST BP >= 140 MM HG: CPT | Mod: CPTII,,, | Performed by: NURSE PRACTITIONER

## 2023-02-21 PROCEDURE — A9503 TC99M MEDRONATE: HCPCS

## 2023-02-21 PROCEDURE — 1160F PR REVIEW ALL MEDS BY PRESCRIBER/CLIN PHARMACIST DOCUMENTED: ICD-10-PCS | Mod: CPTII,,, | Performed by: NURSE PRACTITIONER

## 2023-02-21 PROCEDURE — 93922 US ARTERIAL LOWER EXTREMITY BILAT WITH ABI (XPD): ICD-10-PCS | Mod: 26,,, | Performed by: RADIOLOGY

## 2023-02-21 PROCEDURE — 78315 NM BONE SCAN 3 PHASE FOOT: ICD-10-PCS | Mod: 26,,, | Performed by: RADIOLOGY

## 2023-02-21 PROCEDURE — 93922 UPR/L XTREMITY ART 2 LEVELS: CPT | Mod: 26,,, | Performed by: RADIOLOGY

## 2023-02-21 PROCEDURE — 78315 BONE IMAGING 3 PHASE: CPT | Mod: 26,,, | Performed by: RADIOLOGY

## 2023-02-21 PROCEDURE — 78315 BONE IMAGING 3 PHASE: CPT | Mod: TC

## 2023-02-21 RX ORDER — FLASH GLUCOSE SENSOR
1 KIT MISCELLANEOUS
COMMUNITY
Start: 2023-02-09 | End: 2023-11-15 | Stop reason: SDUPTHER

## 2023-02-21 RX ORDER — SILVER SULFADIAZINE 10 G/1000G
CREAM TOPICAL DAILY
Qty: 400 G | Refills: 2 | Status: SHIPPED | OUTPATIENT
Start: 2023-02-21 | End: 2023-11-15

## 2023-02-21 RX ORDER — FLASH GLUCOSE SCANNING READER
1 EACH MISCELLANEOUS
COMMUNITY
Start: 2023-01-30

## 2023-02-21 NOTE — ASSESSMENT & PLAN NOTE
Left Great Toe: Wash wound with baby shampoo and water or vashe.  Apply silvadene to wound  Cover with dry gauze. Wrap with khushbu/kerlix. Secure with paper tape.  Change daily and as needed for soilage.      Monitor closely for s/s of infection including fever, chills, increase in pain, odor from wound, and increased redness from foot. Go to ER if any complications develop.   Keep leg elevated and avoid pressure on wound.      Diabetes:  Monitor glucose closely. Check fasting glucose and 2 hours after meals. HgA1C goal <7, fasting glucose , and 2 hours after meals <180  Hypertension:  Check blood pressure twice daily, goal <120/80  Diet:   Increase protein intake, avoid fried, fatty foods and foods high in simple carbs.   Vitamins:  Take vitamin C 1000 mg, zinc 50mg, vitamin d 5000 units, and a daily multivitamin. Prashanth is a good source of protein and nutrients to aid in wound healing.

## 2023-02-28 ENCOUNTER — OFFICE VISIT (OUTPATIENT)
Dept: FAMILY MEDICINE | Facility: CLINIC | Age: 63
End: 2023-02-28
Payer: MEDICARE

## 2023-02-28 VITALS
HEIGHT: 67 IN | HEART RATE: 80 BPM | SYSTOLIC BLOOD PRESSURE: 172 MMHG | WEIGHT: 210.63 LBS | TEMPERATURE: 98 F | DIASTOLIC BLOOD PRESSURE: 76 MMHG | RESPIRATION RATE: 16 BRPM | BODY MASS INDEX: 33.06 KG/M2 | OXYGEN SATURATION: 95 %

## 2023-02-28 DIAGNOSIS — E11.65 TYPE 2 DIABETES MELLITUS WITH HYPERGLYCEMIA, WITH LONG-TERM CURRENT USE OF INSULIN: ICD-10-CM

## 2023-02-28 DIAGNOSIS — E11.42 TYPE 2 DIABETES MELLITUS WITH DIABETIC POLYNEUROPATHY, WITH LONG-TERM CURRENT USE OF INSULIN: Primary | ICD-10-CM

## 2023-02-28 DIAGNOSIS — Z79.4 TYPE 2 DIABETES MELLITUS WITH HYPERGLYCEMIA, WITH LONG-TERM CURRENT USE OF INSULIN: ICD-10-CM

## 2023-02-28 DIAGNOSIS — M86.9 OSTEOMYELITIS OF LEFT FOOT, UNSPECIFIED TYPE: ICD-10-CM

## 2023-02-28 DIAGNOSIS — Z79.4 TYPE 2 DIABETES MELLITUS WITH DIABETIC POLYNEUROPATHY, WITH LONG-TERM CURRENT USE OF INSULIN: Primary | ICD-10-CM

## 2023-02-28 DIAGNOSIS — M86.172 OSTEOMYELITIS OF FOOT, LEFT, ACUTE: ICD-10-CM

## 2023-02-28 LAB
EST. AVERAGE GLUCOSE BLD GHB EST-MCNC: 140 MG/DL
HBA1C MFR BLD HPLC: 6.8 % (ref 4.5–6.6)

## 2023-02-28 PROCEDURE — 3078F PR MOST RECENT DIASTOLIC BLOOD PRESSURE < 80 MM HG: ICD-10-PCS | Mod: ,,, | Performed by: FAMILY MEDICINE

## 2023-02-28 PROCEDURE — 3008F PR BODY MASS INDEX (BMI) DOCUMENTED: ICD-10-PCS | Mod: ,,, | Performed by: FAMILY MEDICINE

## 2023-02-28 PROCEDURE — 1159F MED LIST DOCD IN RCRD: CPT | Mod: ,,, | Performed by: FAMILY MEDICINE

## 2023-02-28 PROCEDURE — 3078F DIAST BP <80 MM HG: CPT | Mod: ,,, | Performed by: FAMILY MEDICINE

## 2023-02-28 PROCEDURE — 3008F BODY MASS INDEX DOCD: CPT | Mod: ,,, | Performed by: FAMILY MEDICINE

## 2023-02-28 PROCEDURE — 3077F PR MOST RECENT SYSTOLIC BLOOD PRESSURE >= 140 MM HG: ICD-10-PCS | Mod: ,,, | Performed by: FAMILY MEDICINE

## 2023-02-28 PROCEDURE — 1159F PR MEDICATION LIST DOCUMENTED IN MEDICAL RECORD: ICD-10-PCS | Mod: ,,, | Performed by: FAMILY MEDICINE

## 2023-02-28 PROCEDURE — 83036 HEMOGLOBIN A1C: ICD-10-PCS | Mod: ,,, | Performed by: CLINICAL MEDICAL LABORATORY

## 2023-02-28 PROCEDURE — 3044F PR MOST RECENT HEMOGLOBIN A1C LEVEL <7.0%: ICD-10-PCS | Mod: ,,, | Performed by: FAMILY MEDICINE

## 2023-02-28 PROCEDURE — 99213 OFFICE O/P EST LOW 20 MIN: CPT | Mod: ,,, | Performed by: FAMILY MEDICINE

## 2023-02-28 PROCEDURE — 83036 HEMOGLOBIN GLYCOSYLATED A1C: CPT | Mod: ,,, | Performed by: CLINICAL MEDICAL LABORATORY

## 2023-02-28 PROCEDURE — 99213 PR OFFICE/OUTPT VISIT, EST, LEVL III, 20-29 MIN: ICD-10-PCS | Mod: ,,, | Performed by: FAMILY MEDICINE

## 2023-02-28 PROCEDURE — 3077F SYST BP >= 140 MM HG: CPT | Mod: ,,, | Performed by: FAMILY MEDICINE

## 2023-02-28 PROCEDURE — 3044F HG A1C LEVEL LT 7.0%: CPT | Mod: ,,, | Performed by: FAMILY MEDICINE

## 2023-02-28 RX ORDER — LEVOFLOXACIN 750 MG/1
750 TABLET ORAL DAILY
Qty: 30 TABLET | Refills: 0 | Status: SHIPPED | OUTPATIENT
Start: 2023-02-28 | End: 2023-03-30

## 2023-02-28 RX ORDER — DAPTOMYCIN 50 MG/ML
6 INJECTION, POWDER, LYOPHILIZED, FOR SOLUTION INTRAVENOUS DAILY
Qty: 34.2 EACH | Refills: 1 | Status: CANCELLED
Start: 2023-02-28 | End: 2023-04-04

## 2023-02-28 RX ORDER — DAPTOMYCIN 50 MG/ML
6 INJECTION, POWDER, LYOPHILIZED, FOR SOLUTION INTRAVENOUS DAILY
Qty: 34.2 EACH | Refills: 1
Start: 2023-02-28 | End: 2023-03-30

## 2023-02-28 NOTE — ASSESSMENT & PLAN NOTE
- Called [facility] ([phone]) and spoke with the pharmacist Shaun Grossman. Will continue IV Daptomycin at the same dose for 4 more weeks. Also prescribed 4 more weeks of Levofloxacin PO. Will keep the PICC line in place for at least 4 more weeks.   - Follow-up with Wound care   - Pending pre-cert for HBOT  - Continue Home Health

## 2023-02-28 NOTE — PROGRESS NOTES
Subjective:       Patient ID: Brendon Norris is a 62 y.o. male.    Chief Complaint: Follow-up (Osteomyelitis of foot )    Patient is a 62 year old  male who presents for follow-up of his LT first great toe osteomyelitis. Today would be completion of 6 weeks of IV Daptomycin and Levofloxacin PO. However, the patient had a [place] bone scan, 3 phase of his left foot on 2/21/2023 that demonstrated increased activity in the 1st digit of the LT foot consistent with osteomyelitis. The patient was referred to wound care, and is undergoing pre-certification for hyperbaric wound care. ABIs were recently performed that indicated ISAAC 1.17 on the RT and 1.24 on the LT. The decision was made to continue IV antibiotics for 4 more weeks. Patient denies any pain in the left first digit of his foot, and it is healing well. Patient denies fever or chills.     DMII: The patient is wearing a continuous glucose monitor with glucose values from 69 to 232. The patient's last HBA1c was 8.4. Repeat HBA1c for today. Patient is using Lantus 80 units qpm, Novolog 16u/24u/30u with meals, and Metformin 1,000mg BID.         Follow-up  Pertinent negatives include no abdominal pain, chest pain, chills, congestion, coughing, fatigue, fever, headaches, nausea, sore throat, vomiting or weakness.   Review of Systems   Constitutional:  Negative for chills, fatigue and fever.   HENT:  Negative for nasal congestion, sinus pressure/congestion and sore throat.    Respiratory:  Negative for cough, chest tightness, shortness of breath and wheezing.    Cardiovascular:  Negative for chest pain.   Gastrointestinal:  Negative for abdominal pain, diarrhea, nausea and vomiting.   Genitourinary:  Negative for dysuria.   Integumentary:  Positive for color change and wound.   Neurological:  Negative for dizziness, weakness and headaches.       Objective:      Physical Exam  Constitutional:       General: He is not in acute distress.     Appearance: Normal  appearance.   HENT:      Head: Normocephalic and atraumatic.      Right Ear: External ear normal.      Left Ear: External ear normal.      Nose: Nose normal.      Mouth/Throat:      Mouth: Mucous membranes are moist.   Eyes:      Extraocular Movements: Extraocular movements intact.      Conjunctiva/sclera: Conjunctivae normal.      Pupils: Pupils are equal, round, and reactive to light.   Cardiovascular:      Rate and Rhythm: Normal rate and regular rhythm.      Pulses:           Dorsalis pedis pulses are 1+ on the right side and 1+ on the left side.        Posterior tibial pulses are 1+ on the right side and 1+ on the left side.      Heart sounds: Normal heart sounds.   Pulmonary:      Effort: Pulmonary effort is normal.      Breath sounds: Normal breath sounds.   Abdominal:      General: Bowel sounds are normal.      Palpations: Abdomen is soft.   Musculoskeletal:         General: Normal range of motion.        Arms:       Cervical back: Normal range of motion.      Left foot: Swelling and tenderness present.      Comments: PICC line present; no infiltration, erythema, or swelling noted   Feet:      Right foot:      Toenail Condition: Right toenails are abnormally thick.      Left foot:      Skin integrity: Erythema, warmth and dry skin present.      Toenail Condition: Left toenails are abnormally thick.   Skin:     General: Skin is warm.      Findings: Erythema and lesion present.      Comments: Lesion on plantar surface of LT first digit of foot is well healed     Purple in color     PICC line in LT antecubital fossa; not infiltrated     Continuous glucose monitor on the RT upper arm            Neurological:      General: No focal deficit present.      Mental Status: He is alert and oriented to person, place, and time.   Psychiatric:         Mood and Affect: Mood normal.         Behavior: Behavior normal.         Thought Content: Thought content normal.         Judgment: Judgment normal.       Assessment:        Problem List Items Addressed This Visit          ID    Osteomyelitis of left foot     - Called [facility] ([phone]) and spoke with the pharmacist - Ngoc. Will continue IV Daptomycin at the same dose for 4 more weeks. Also prescribed 4 more weeks of Levofloxacin PO. Will keep the PICC line in place for at least 4 more weeks.   - Follow-up with Wound care   - Pending pre-cert for HBOT  - Continue Home Health               Endocrine    Type 2 diabetes mellitus with hyperglycemia, with long-term current use of insulin     - HBA1c pending  - Will consider making adjustments to DMII medications; but elevated glucose levels likely to be contributed to by recent infection.   - Continue wearing the CGM; logging values             Other Visit Diagnoses       Type 2 diabetes mellitus with diabetic polyneuropathy, with long-term current use of insulin    -  Primary    Relevant Orders    Hemoglobin A1C    Osteomyelitis of foot, left, acute        Relevant Medications    levoFLOXacin (LEVAQUIN) 750 MG tablet    DAPTOmycin (CUBICIN) 500 mg injection              Plan:       Type 2 diabetes mellitus with diabetic polyneuropathy, with long-term current use of insulin  -     Hemoglobin A1C; Future; Expected date: 02/28/2023    Osteomyelitis of foot, left, acute  -     levoFLOXacin (LEVAQUIN) 750 MG tablet; Take 1 tablet (750 mg total) by mouth once daily.  Dispense: 30 tablet; Refill: 0  -     DAPTOmycin (CUBICIN) 500 mg injection; Inject 570 mg into the vein once daily.  Dispense: 34.2 each; Refill: 1    Osteomyelitis of left foot, unspecified type    Type 2 diabetes mellitus with hyperglycemia, with long-term current use of insulin

## 2023-02-28 NOTE — ASSESSMENT & PLAN NOTE
- HBA1c pending  - Will consider making adjustments to DMII medications; but elevated glucose levels likely to be contributed to by recent infection.   - Continue wearing the CGM; logging values

## 2023-03-02 NOTE — PROGRESS NOTES
Subjective:       Patient ID: Brendon Norris is a 62 y.o. male.    Chief Complaint: Follow-up    Patient is a 62 year old  male who is being treated with outpatient IV antibiotics (Daptomycin IV) and Levofloxacin PO for left great toe osteomyelitis. He is here for follow-up. The patient has no complaints. The PICC line is intact and not infiltrated. Will consult wound care for possible hyperbaric oxygen therapy and debridement.    Follow-up  Pertinent negatives include no abdominal pain, chest pain, chills, congestion, coughing, fatigue, fever, headaches, nausea, sore throat, vomiting or weakness. Review of Systems   Constitutional:  Negative for chills, fatigue and fever.   HENT:  Negative for nasal congestion, sinus pressure/congestion and sore throat.    Respiratory:  Negative for cough, chest tightness, shortness of breath and wheezing.    Cardiovascular:  Negative for chest pain.   Gastrointestinal:  Negative for abdominal pain, diarrhea, nausea and vomiting.   Genitourinary:  Negative for dysuria.   Integumentary:  Positive for color change and wound.   Neurological:  Negative for dizziness, weakness and headaches.       Objective:      Physical Exam  Constitutional:       General: He is not in acute distress.     Appearance: Normal appearance.   HENT:      Head: Normocephalic and atraumatic.      Right Ear: External ear normal.      Left Ear: External ear normal.      Nose: Nose normal.      Mouth/Throat:      Mouth: Mucous membranes are moist.   Eyes:      Extraocular Movements: Extraocular movements intact.      Conjunctiva/sclera: Conjunctivae normal.      Pupils: Pupils are equal, round, and reactive to light.   Cardiovascular:      Rate and Rhythm: Normal rate and regular rhythm.      Pulses:           Dorsalis pedis pulses are 1+ on the right side and 1+ on the left side.        Posterior tibial pulses are 1+ on the right side and 1+ on the left side.      Heart sounds: Normal heart sounds.    Pulmonary:      Effort: Pulmonary effort is normal.      Breath sounds: Normal breath sounds.   Abdominal:      General: Bowel sounds are normal.      Palpations: Abdomen is soft.   Musculoskeletal:         General: Normal range of motion.      Cervical back: Normal range of motion.      Left foot: Swelling and tenderness present.   Feet:      Right foot:      Toenail Condition: Right toenails are abnormally thick.      Left foot:      Skin integrity: Erythema, warmth and dry skin present.      Toenail Condition: Left toenails are abnormally thick.   Skin:     General: Skin is warm.      Comments: Erythema and broken skin noted on the LT 1st digit     Cellulitis of LT foot and calf        Neurological:      General: No focal deficit present.      Mental Status: He is alert and oriented to person, place, and time.   Psychiatric:         Mood and Affect: Mood normal.         Behavior: Behavior normal.         Thought Content: Thought content normal.         Judgment: Judgment normal.       Assessment:       Problem List Items Addressed This Visit          ID    Osteomyelitis of left foot      - X-ray of LT foot: Osteomyelitis of the proximal internal phalange of the great toe  - Continue IV Daptomycin for total of 6 weeks   - Continue Levofloxacin PO for total of 6 weeks (same bioavailability as IV) (per ID)   - Wound care consult placed for dressings and hyperbaric oxygen therapy.   - Home Health to be updated regarding weekly CBC, ESR, and CRP. After review of home health documents, CRP and ESR is trending downward                     Plan:       Osteomyelitis of left foot, unspecified type

## 2023-03-02 NOTE — PROGRESS NOTES
Called the patient, but no answer. Left a voicemail. Will call again and if cannot be reached, will discuss HBA1c of 6.8 (down from 8.4, 2 months ago) at his next appointment.

## 2023-03-12 NOTE — ASSESSMENT & PLAN NOTE
- X-ray of LT foot: Osteomyelitis of the proximal internal phalange of the great toe  - Continue IV Daptomycin for total of 6 weeks   - Continue Levofloxacin PO for total of 6 weeks (same bioavailability as IV) (per ID)   - Wound care consult placed for dressings and hyperbaric oxygen therapy.   - Home Health to be updated regarding weekly CBC, ESR, and CRP. After review of home health documents, CRP and ESR is trending downward

## 2023-03-13 NOTE — PROGRESS NOTES
Ajit Swan III, DO   [facility]  [address]  [place] [address]  [phone]      PATIENT NAME: Brendon Norris  : 1960  DATE: 3/14/23  MRN: [id number]      Billing Provider: Ajit Swan III, DO  Level of Service:   Patient PCP Information       Provider PCP Type    Natalie Armstrong MD General            Reason for Visit / Chief Complaint: Diabetic Foot Ulcer (Left great toe)       History of Present Illness / Problem Focused Workflow     Brendon Norris is a 61 yo male presents to clinic for for follow up on chronic non healing wound to left great toe. Maceration has significantly improved. He had arterial doppler and bone scan today. Pre certification to [facility] for HBOT.     Arterial doppler, Impression:  The ankle to brachial index is 1.17 on the right and 1.24 on the left.  No arterial occlusions demonstrated.  Mild/moderate arterial velocity elevations as detailed.  Nonspecific moderately prominent lymph node within the left upper thigh.  Follow up with PCP concerning enlarged lymph node for further evaluation    Three Phase Bone Scan - Impression:  Increased activity 1st digit left foot consistent with osteomyelitis.    23 - He was referred by Dr. Armstrong for evaluation for hyperbarics. Reports that he has had issues with swelling and open wound intermittently for several years. In January, he developed erythema and edema to left great toe. X-ray on , Impression: There is osteolysis involving the proximal internal phalanges of the great toe consistent with osteomyelitis. He is currently on a 6 week course of IV antibiotics. Erythema and edema have improved. Patricia-wound with maceration, bedside debridement today. Repeat x-ray, arterial doppler, and 3 phase bone scan ordered for insurance pre-certification. 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 Discussed in detail importance of wearing off-loading shoe, elevation  left lower extremity, and increasing protein, vitamin C, and zinc to aid in wound healing.   Pertinent PMH includes diabetes, hypertension, CAD, and hyperlipedemia. Last HgA1C 8.4 12/22, diabetes is managed by PCP. Wound healing is complicated by hyperglycemia, multiple co-morbidities, poor vascular supply, diabetes, edema, excessive wound moisture and macerated tissue, decreased granulation tissue, no protective sensation, and infection.             Review of Systems     Review of Systems   Constitutional: Negative.  Negative for activity change, appetite change and fever.   HENT:  Negative for congestion, ear pain, nosebleeds and trouble swallowing.    Eyes:  Negative for pain and itching.   Respiratory:  Negative for chest tightness.    Cardiovascular:  Positive for leg swelling. Negative for chest pain and palpitations.   Gastrointestinal:  Negative for abdominal distention.   Endocrine: Negative for cold intolerance and heat intolerance.   Genitourinary:  Negative for difficulty urinating.   Musculoskeletal:  Positive for arthralgias and joint swelling.   Skin:  Positive for color change and wound.        wound   Neurological:  Positive for numbness. Negative for dizziness.   Psychiatric/Behavioral:  Negative for confusion and decreased concentration.      Medical / Social / Family History     Past Medical History:   Diagnosis Date    Coronary artery disease     Diabetes mellitus, type 2     GERD (gastroesophageal reflux disease)     Hyperlipidemia     Hypertension        History reviewed. No pertinent surgical history.    Social History  Mr. Brendon Norris  reports that he has quit smoking. His smoking use included cigarettes. He has a 1.00 pack-year smoking history. He has never used smokeless tobacco. He reports that he does not currently use alcohol. He reports that he does not currently use drugs.    Family History  .'s Brendon Norris family history includes Learning disabilities in his  son.    Medications and Allergies     Medications  Outpatient Medications Marked as Taking for the 3/14/23 encounter (Office Visit) with Ajit Swan III, DO   Medication Sig Dispense Refill    albuterol (PROVENTIL/VENTOLIN HFA) 90 mcg/actuation inhaler Inhale 2 puffs into the lungs 4 (four) times daily as needed for Wheezing. 18 g 5    amLODIPine (NORVASC) 5 MG tablet Take 1 tablet by mouth twice daily 180 tablet 1    aspirin-calcium carbonate 81 mg-300 mg calcium(777 mg) Tab Take 81 mg by mouth once daily.      BLOOD-GLUCOSE METER,CONTINUOUS MISC 1 Device by NOT APPLICABLE route.      dapagliflozin (FARXIGA) 10 mg tablet Take 10 mg by mouth once daily.      DAPTOmycin (CUBICIN) 500 mg injection Inject 570 mg into the vein once daily. 34.2 each 1    esomeprazole (NEXIUM) 40 MG capsule Take 1 capsule (40 mg total) by mouth once daily. 90 capsule 0    ezetimibe (ZETIA) 10 mg tablet Take 10 mg by mouth.      FREESTYLE URVASHI 2 READER Misc 1 Device.      FREESTYLE URVASHI 2 SENSOR Kit 1 applicator by subcutaneous (via wearable injector) route every 14 (fourteen) days.      insulin aspart U-100 (NOVOLOG FLEXPEN U-100 INSULIN) 100 unit/mL (3 mL) InPn pen Inject 16 Units into the skin 3 (three) times daily. Inject 16 units at breakfast, 24 units at lunch, and 30 units at supper. Plus sliding scale of 2 units/50 > 150. Max total daily dose of 100 units. 43.2 mL 1    Lactobacillus rhamnosus GG (CULTURELLE) 10 billion cell capsule Take 1 capsule by mouth once daily. 60 capsule 0    LANTUS SOLOSTAR U-100 INSULIN glargine 100 units/mL SubQ pen Inject into the skin.      levoFLOXacin (LEVAQUIN) 750 MG tablet Take 1 tablet (750 mg total) by mouth once daily. 30 tablet 0    metFORMIN (GLUCOPHAGE-XR) 500 MG ER 24hr tablet Take 2 tablets (1,000 mg total) by mouth 2 (two) times a day. 180 tablet 1    metoprolol tartrate (LOPRESSOR) 25 MG tablet Take 1 tablet (25 mg total) by mouth 2 (two) times daily. 180 tablet 1    nebivoloL  "(BYSTOLIC) 10 MG Tab Take 10 mg by mouth.      nitroGLYCERIN (NITROSTAT) 0.4 MG SL tablet Place 1 tablet under the tongue every 5 (five) minutes as needed.      olmesartan (BENICAR) 20 MG tablet Take 1 tablet (20 mg total) by mouth once daily. 90 tablet 1    pen needle, diabetic 31 gauge x 1/4" Ndle pen needle, diabetic 31 gauge x 1/4"   USE AS DIRECTED 4 TIMES DAILY      pen needle, diabetic 31 gauge x 3/16" Ndle BD Ultra-Fine Mini Pen Needle 31 gauge x 3/16"   USE 1 4 TIMES DAILY      pen needle, diabetic 31 gauge x 5/16" Ndle Inject 1 Units into the skin 4 (four) times daily. 100 each 1    rosuvastatin (CRESTOR) 40 MG Tab Take 1 tablet (40 mg total) by mouth nightly. 90 tablet 1    silver sulfADIAZINE 1% (SILVADENE) 1 % cream Apply topically once daily. 400 g 2    tiZANidine (ZANAFLEX) 4 MG tablet tizanidine 4 mg tablet   TAKE 1 TABLET BY MOUTH EVERY 6 HOURS AS NEEDED         Allergies  Review of patient's allergies indicates:   Allergen Reactions    Lisinopril      Other reaction(s): Cough       Physical Examination     Vitals:    03/14/23 1322   BP: (!) 167/89   Pulse: 72   Resp: 20   Temp: 98.2 °F (36.8 °C)     Physical Exam  Vitals and nursing note reviewed.   Constitutional:       Appearance: Normal appearance.   HENT:      Head: Normocephalic.   Cardiovascular:      Rate and Rhythm: Normal rate and regular rhythm.      Pulses: Normal pulses.      Heart sounds: Normal heart sounds.   Pulmonary:      Effort: Pulmonary effort is normal. No respiratory distress.   Chest:      Chest wall: No tenderness.   Musculoskeletal:         General: Swelling present. No tenderness.      Left lower leg: Edema present.   Skin:     General: Skin is warm.      Coloration: Skin is not jaundiced.      Findings: Lesion present. No bruising.      Comments: See LDA for photos/measurements   Neurological:      General: No focal deficit present.      Mental Status: He is alert and oriented to person, place, and time. Mental status is " at baseline.      Sensory: Sensory deficit present.   Psychiatric:         Mood and Affect: Mood normal.         Behavior: Behavior normal.         Thought Content: Thought content normal.         Judgment: Judgment normal.     Assessment and Plan             Altered Skin Integrity 02/08/23 1316 Left posterior Toe, first (Active)   02/08/23 1316   Altered Skin Integrity Present on Admission - Did Patient arrive to the hospital with altered skin?:    Side: Left   Orientation: posterior   Location: Toe, first   Wound Number:    Is this injury device related?:    Primary Wound Type:    Description of Altered Skin Integrity:    Ankle-Brachial Index:    Pulses:    Removal Indication and Assessment:    Wound Outcome:    (Retired) Wound Length (cm):    (Retired) Wound Width (cm):    (Retired) Depth (cm):    Wound Description (Comments):    Removal Indications:    Wound Image    03/14/23 1325   Description of Altered Skin Integrity Intact skin with non-blanchable redness of localized area 03/14/23 1325   Dressing Appearance Dry;Intact;Clean 03/14/23 1325   Drainage Amount None 03/14/23 1325   Appearance Pink;Intact 03/14/23 1325   Tissue loss description Partial thickness 03/14/23 1325   Black (%), Wound Tissue Color 0 % 03/14/23 1325   Red (%), Wound Tissue Color 100 % 03/14/23 1325   Yellow (%), Wound Tissue Color 0 % 03/14/23 1325   Periwound Area Intact;Dry 03/14/23 1325   Wound Edges Rolled/closed 03/14/23 1325   Wound Length (cm) 0.2 cm 03/14/23 1325   Wound Width (cm) 0.2 cm 03/14/23 1325   Wound Depth (cm) 0.1 cm 03/14/23 1325   Wound Volume (cm^3) 0.004 cm^3 03/14/23 1325   Wound Surface Area (cm^2) 0.04 cm^2 03/14/23 1325   Care Cleansed with:;Antimicrobial agent 03/14/23 1325   Dressing Applied;Gauze, wet to dry;Gauze 03/14/23 1325   Periwound Care Moisture barrier applied 03/14/23 1325   Dressing Change Due 03/15/23 03/14/23 1325     Problem List Items Addressed This Visit          Neuro    Diabetic  mononeuropathy associated with type 2 diabetes mellitus    Overview     Last Assessment & Plan:   Patient reports stable symptoms. Reinforced counseling on the importance of proper BG control to help improve symptoms and slow progression of peripheral neuropathy long term.            Derm    Pre-ulcerative calluses    Overview     Last Assessment & Plan:   Currently taking bactrim for wound underlying callous on left great toe. FU with PCP as recommended. Discussed effect of hyperglycemia on delayed wound healing            Cardiac/Vascular    Hyperlipidemia    Hypertension    Arteriosclerotic cardiovascular disease (ASCVD)    Overview     Recently had 2 stents placed. Followed by Dr. Vaibhav Ta in [place].     Last Assessment & Plan:   Continue to follow with cardiology as scheduled.            Endocrine    Diabetic ulcer of left great toe - Primary    Overview                     Future Appointments   Date Time Provider Department Center   3/28/2023  2:30 PM Natalie Armstrong MD [facility]   5/9/2023  1:00 PM SHAYNA Chambers [facility]             Signature:  Ajit Swan III, DO  [facility]  [address] [facility] [place] [address]  [phone]    Date of encounter: 3/14/23

## 2023-03-13 NOTE — PATIENT INSTRUCTIONS
Left Great Toe: Wash wound with baby shampoo and water or vashe.  Apply aquacel ag to wound bed (cut slightly larger than wound)  Cover with dry gauze. Wrap with khushbu/kerlix. Secure with paper tape.  Change daily and as needed for soilage.      Monitor closely for s/s of infection including fever, chills, increase in pain, odor from wound, and increased redness from foot. Go to ER if any complications develop.   Keep leg elevated and avoid pressure on wound.      Diabetes:  Monitor glucose closely. Check fasting glucose and 2 hours after meals. HgA1C goal <7, fasting glucose , and 2 hours after meals <180  Hypertension:  Check blood pressure twice daily, goal <120/80  Diet:   Increase protein intake, avoid fried, fatty foods and foods high in simple carbs.   Vitamins:  Take vitamin C 1000 mg, zinc 50mg, vitamin d 5000 units, and a daily multivitamin. Prashanth is a good source of protein and nutrients to aid in wound healing.

## 2023-03-14 ENCOUNTER — OFFICE VISIT (OUTPATIENT)
Dept: WOUND CARE | Facility: CLINIC | Age: 63
End: 2023-03-14
Attending: FAMILY MEDICINE
Payer: MEDICARE

## 2023-03-14 VITALS
SYSTOLIC BLOOD PRESSURE: 167 MMHG | RESPIRATION RATE: 20 BRPM | DIASTOLIC BLOOD PRESSURE: 89 MMHG | HEART RATE: 72 BPM | TEMPERATURE: 98 F

## 2023-03-14 DIAGNOSIS — L97.529 DIABETIC ULCER OF LEFT GREAT TOE: Primary | ICD-10-CM

## 2023-03-14 DIAGNOSIS — I25.10 ARTERIOSCLEROTIC CARDIOVASCULAR DISEASE (ASCVD): ICD-10-CM

## 2023-03-14 DIAGNOSIS — I10 HYPERTENSION, UNSPECIFIED TYPE: ICD-10-CM

## 2023-03-14 DIAGNOSIS — L84 PRE-ULCERATIVE CALLUSES: ICD-10-CM

## 2023-03-14 DIAGNOSIS — E11.41 DIABETIC MONONEUROPATHY ASSOCIATED WITH TYPE 2 DIABETES MELLITUS: ICD-10-CM

## 2023-03-14 DIAGNOSIS — E11.621 DIABETIC ULCER OF LEFT GREAT TOE: Primary | ICD-10-CM

## 2023-03-14 DIAGNOSIS — E78.5 HYPERLIPIDEMIA, UNSPECIFIED HYPERLIPIDEMIA TYPE: ICD-10-CM

## 2023-03-14 PROCEDURE — 3077F SYST BP >= 140 MM HG: CPT | Mod: CPTII,,, | Performed by: FAMILY MEDICINE

## 2023-03-14 PROCEDURE — 3079F PR MOST RECENT DIASTOLIC BLOOD PRESSURE 80-89 MM HG: ICD-10-PCS | Mod: CPTII,,, | Performed by: FAMILY MEDICINE

## 2023-03-14 PROCEDURE — 3044F HG A1C LEVEL LT 7.0%: CPT | Mod: CPTII,,, | Performed by: FAMILY MEDICINE

## 2023-03-14 PROCEDURE — 3044F PR MOST RECENT HEMOGLOBIN A1C LEVEL <7.0%: ICD-10-PCS | Mod: CPTII,,, | Performed by: FAMILY MEDICINE

## 2023-03-14 PROCEDURE — 99214 PR OFFICE/OUTPT VISIT, EST, LEVL IV, 30-39 MIN: ICD-10-PCS | Mod: S$PBB,,, | Performed by: FAMILY MEDICINE

## 2023-03-14 PROCEDURE — 99215 OFFICE O/P EST HI 40 MIN: CPT | Mod: PBBFAC | Performed by: FAMILY MEDICINE

## 2023-03-14 PROCEDURE — 1159F MED LIST DOCD IN RCRD: CPT | Mod: CPTII,,, | Performed by: FAMILY MEDICINE

## 2023-03-14 PROCEDURE — 99214 OFFICE O/P EST MOD 30 MIN: CPT | Mod: S$PBB,,, | Performed by: FAMILY MEDICINE

## 2023-03-14 PROCEDURE — 1159F PR MEDICATION LIST DOCUMENTED IN MEDICAL RECORD: ICD-10-PCS | Mod: CPTII,,, | Performed by: FAMILY MEDICINE

## 2023-03-14 PROCEDURE — 3079F DIAST BP 80-89 MM HG: CPT | Mod: CPTII,,, | Performed by: FAMILY MEDICINE

## 2023-03-14 PROCEDURE — 3077F PR MOST RECENT SYSTOLIC BLOOD PRESSURE >= 140 MM HG: ICD-10-PCS | Mod: CPTII,,, | Performed by: FAMILY MEDICINE

## 2023-03-28 ENCOUNTER — OFFICE VISIT (OUTPATIENT)
Dept: FAMILY MEDICINE | Facility: CLINIC | Age: 63
End: 2023-03-28
Payer: MEDICARE

## 2023-03-28 VITALS
WEIGHT: 210 LBS | HEART RATE: 73 BPM | RESPIRATION RATE: 20 BRPM | DIASTOLIC BLOOD PRESSURE: 82 MMHG | SYSTOLIC BLOOD PRESSURE: 162 MMHG | HEIGHT: 67 IN | BODY MASS INDEX: 32.96 KG/M2 | OXYGEN SATURATION: 98 % | TEMPERATURE: 98 F

## 2023-03-28 DIAGNOSIS — M86.179 OSTEOMYELITIS OF FOOT, ACUTE: ICD-10-CM

## 2023-03-28 DIAGNOSIS — I10 HYPERTENSION, UNSPECIFIED TYPE: ICD-10-CM

## 2023-03-28 DIAGNOSIS — M86.179 ACUTE OSTEOMYELITIS OF FOOT: Primary | ICD-10-CM

## 2023-03-28 PROCEDURE — 3044F HG A1C LEVEL LT 7.0%: CPT | Mod: ,,, | Performed by: FAMILY MEDICINE

## 2023-03-28 PROCEDURE — 3079F PR MOST RECENT DIASTOLIC BLOOD PRESSURE 80-89 MM HG: ICD-10-PCS | Mod: ,,, | Performed by: FAMILY MEDICINE

## 2023-03-28 PROCEDURE — 1159F PR MEDICATION LIST DOCUMENTED IN MEDICAL RECORD: ICD-10-PCS | Mod: ,,, | Performed by: FAMILY MEDICINE

## 2023-03-28 PROCEDURE — 3079F DIAST BP 80-89 MM HG: CPT | Mod: ,,, | Performed by: FAMILY MEDICINE

## 2023-03-28 PROCEDURE — 3077F PR MOST RECENT SYSTOLIC BLOOD PRESSURE >= 140 MM HG: ICD-10-PCS | Mod: ,,, | Performed by: FAMILY MEDICINE

## 2023-03-28 PROCEDURE — 1159F MED LIST DOCD IN RCRD: CPT | Mod: ,,, | Performed by: FAMILY MEDICINE

## 2023-03-28 PROCEDURE — 99213 PR OFFICE/OUTPT VISIT, EST, LEVL III, 20-29 MIN: ICD-10-PCS | Mod: ,,, | Performed by: FAMILY MEDICINE

## 2023-03-28 PROCEDURE — 3008F PR BODY MASS INDEX (BMI) DOCUMENTED: ICD-10-PCS | Mod: ,,, | Performed by: FAMILY MEDICINE

## 2023-03-28 PROCEDURE — 99213 OFFICE O/P EST LOW 20 MIN: CPT | Mod: ,,, | Performed by: FAMILY MEDICINE

## 2023-03-28 PROCEDURE — 3077F SYST BP >= 140 MM HG: CPT | Mod: ,,, | Performed by: FAMILY MEDICINE

## 2023-03-28 PROCEDURE — 3008F BODY MASS INDEX DOCD: CPT | Mod: ,,, | Performed by: FAMILY MEDICINE

## 2023-03-28 PROCEDURE — 4010F ACE/ARB THERAPY RXD/TAKEN: CPT | Mod: ,,, | Performed by: FAMILY MEDICINE

## 2023-03-28 PROCEDURE — 4010F PR ACE/ARB THEARPY RXD/TAKEN: ICD-10-PCS | Mod: ,,, | Performed by: FAMILY MEDICINE

## 2023-03-28 PROCEDURE — 3044F PR MOST RECENT HEMOGLOBIN A1C LEVEL <7.0%: ICD-10-PCS | Mod: ,,, | Performed by: FAMILY MEDICINE

## 2023-03-28 RX ORDER — OLMESARTAN MEDOXOMIL 40 MG/1
40 TABLET ORAL DAILY
Qty: 90 TABLET | Refills: 3 | Status: SHIPPED | OUTPATIENT
Start: 2023-03-28 | End: 2024-03-27

## 2023-03-28 NOTE — ASSESSMENT & PLAN NOTE
Wound on left great toe is fully healed and not tender to palpation. Patient states that insurance would not cover hyperbaric oxygen therapy. ESR and CRP levels (most recent) were 26 and <0.2 respectively.     - Completed 10 total weeks of antibiotics (IV Daptomycin and PO Bactrim)   - Stopping antibiotics and pulling PICC line; made home health service aware   - Repeat bone scan pending

## 2023-03-28 NOTE — PROGRESS NOTES
Subjective     Patient ID: Brendon Norris is a 62 y.o. male.    Chief Complaint: Follow-up (3 WEEK FU OSTEOMYELITIS LEFT FOOT AND DM2)    Patient is a 62 year old  male who presents for a follow-up for his left great toe osteomyelitis. The patient has had a total of 10 weeks of IV Daptomycin and Bactrim PO. Patient has a PICC line in left antecubital fossa. Patient denies any acute complaints. Wound on great toe is closed and well healed. Erythema is significantly improved. Labs (CRP and ESR) are improved. Patient's most recent bone scan 2/8/2023 indicated: Increased activity 1st digit left foot consistent with osteomyelitis. Repeat bone scan pending. [facility] faxed orders for discontinuation of antibiotics, lab work, and to pull the PICC line.     Follow-up  Pertinent negatives include no abdominal pain, chest pain, chills, congestion, coughing, fatigue, fever, headaches, nausea, sore throat, vomiting or weakness. Review of Systems   Constitutional:  Negative for chills, fatigue and fever.   HENT:  Negative for nasal congestion, sinus pressure/congestion and sore throat.    Respiratory:  Negative for cough, chest tightness, shortness of breath and wheezing.    Cardiovascular:  Negative for chest pain.   Gastrointestinal:  Negative for abdominal pain, diarrhea, nausea and vomiting.   Genitourinary:  Negative for dysuria.   Neurological:  Negative for dizziness, weakness and headaches.        Objective     Physical Exam  Constitutional:       General: He is not in acute distress.     Appearance: Normal appearance.   HENT:      Head: Normocephalic and atraumatic.      Right Ear: External ear normal.      Left Ear: External ear normal.      Nose: Nose normal.      Mouth/Throat:      Mouth: Mucous membranes are moist.   Eyes:      Extraocular Movements: Extraocular movements intact.      Conjunctiva/sclera: Conjunctivae normal.      Pupils: Pupils are equal, round, and reactive to light.    Cardiovascular:      Rate and Rhythm: Normal rate and regular rhythm.      Pulses:           Dorsalis pedis pulses are 1+ on the right side and 1+ on the left side.        Posterior tibial pulses are 1+ on the right side and 1+ on the left side.      Heart sounds: Normal heart sounds.   Pulmonary:      Effort: Pulmonary effort is normal.      Breath sounds: Normal breath sounds.   Abdominal:      General: Bowel sounds are normal.      Palpations: Abdomen is soft.   Musculoskeletal:         General: Normal range of motion.        Arms:       Cervical back: Normal range of motion.      Left foot: Swelling and tenderness present.      Comments: PICC line present; no infiltration, erythema, or swelling noted   Feet:      Right foot:      Toenail Condition: Right toenails are abnormally thick.      Left foot:      Skin integrity: Erythema, warmth and dry skin present.      Toenail Condition: Left toenails are abnormally thick.   Skin:     General: Skin is warm.      Findings: Erythema and lesion present.      Comments: Healed wound on the 1st digit of the left foot (plantar surface)    Erythema is much improved from last time. No tenderness to palpation of the first digit on the left foot.            Neurological:      General: No focal deficit present.      Mental Status: He is alert and oriented to person, place, and time.   Psychiatric:         Mood and Affect: Mood normal.         Behavior: Behavior normal.         Thought Content: Thought content normal.         Judgment: Judgment normal.        Vitals:    03/28/23 1432   BP: (!) 162/82   Pulse: 73   Resp: 20   Temp: 98.3 °F (36.8 °C)       Assessment and Plan     Problem List Items Addressed This Visit          Cardiac/Vascular    Hypertension     Patient has had consistently high BP measurements in the office - systolic of 160s to 170s. Increasing Olmesartan from 20mg to 40mg.            Relevant Medications    olmesartan (BENICAR) 40 MG tablet       ID     Osteomyelitis of foot, acute     Wound on left great toe is fully healed and not tender to palpation. Patient states that insurance would not cover hyperbaric oxygen therapy. ESR and CRP levels (most recent) were 26 and <0.2 respectively.     - Completed 10 total weeks of antibiotics (IV Daptomycin and PO Bactrim)   - Stopping antibiotics and pulling PICC line; made home health service aware   - Repeat bone scan pending          Other Visit Diagnoses       Acute osteomyelitis of foot    -  Primary    Relevant Orders    NM Bone Scan 3 Phase Foot            Acute osteomyelitis of foot  -     NM Bone Scan 3 Phase Foot; Future; Expected date: 03/28/2023    Hypertension, unspecified type  -     olmesartan (BENICAR) 40 MG tablet; Take 1 tablet (40 mg total) by mouth once daily.  Dispense: 90 tablet; Refill: 3    Osteomyelitis of foot, acute

## 2023-03-29 NOTE — ASSESSMENT & PLAN NOTE
Patient has had consistently high BP measurements in the office - systolic of 160s to 170s. Increasing Olmesartan from 20mg to 40mg.

## 2023-04-17 ENCOUNTER — PATIENT MESSAGE (OUTPATIENT)
Dept: FAMILY MEDICINE | Facility: CLINIC | Age: 63
End: 2023-04-17
Payer: MEDICARE

## 2023-05-02 ENCOUNTER — HOSPITAL ENCOUNTER (OUTPATIENT)
Dept: RADIOLOGY | Facility: HOSPITAL | Age: 63
Discharge: HOME | End: 2023-05-02
Attending: FAMILY MEDICINE
Payer: MEDICARE

## 2023-05-02 DIAGNOSIS — M86.179 ACUTE OSTEOMYELITIS OF FOOT: ICD-10-CM

## 2023-05-02 DIAGNOSIS — Z79.4 TYPE 2 DIABETES MELLITUS WITH HYPERGLYCEMIA, WITH LONG-TERM CURRENT USE OF INSULIN: Primary | ICD-10-CM

## 2023-05-02 DIAGNOSIS — E11.65 TYPE 2 DIABETES MELLITUS WITH HYPERGLYCEMIA, WITH LONG-TERM CURRENT USE OF INSULIN: Primary | ICD-10-CM

## 2023-05-02 PROCEDURE — 78315 BONE IMAGING 3 PHASE: CPT | Mod: TC

## 2023-05-02 PROCEDURE — A9503 TC99M MEDRONATE: HCPCS

## 2023-05-02 PROCEDURE — 78315 NM BONE SCAN 3 PHASE FOOT: ICD-10-PCS | Mod: 26,,, | Performed by: RADIOLOGY

## 2023-05-02 PROCEDURE — 78315 BONE IMAGING 3 PHASE: CPT | Mod: 26,,, | Performed by: RADIOLOGY

## 2023-05-02 NOTE — PROGRESS NOTES
Called wound care at [facility] regarding HBO therapy. Wound care ([facility]) is going to submit another request to [facility] for HBO since he has already been on 10 weeks of IV antibiotics. Patient was previously denied. Was made aware that [facility] usually only approves HBO when inpatient status.

## 2023-05-17 ENCOUNTER — TELEPHONE (OUTPATIENT)
Dept: FAMILY MEDICINE | Facility: CLINIC | Age: 63
End: 2023-05-17
Payer: MEDICARE

## 2023-05-17 DIAGNOSIS — M86.179 OTHER ACUTE OSTEOMYELITIS, UNSPECIFIED ANKLE AND FOOT: ICD-10-CM

## 2023-05-17 DIAGNOSIS — M86.9 OSTEOMYELITIS OF LEFT FOOT, UNSPECIFIED TYPE: Primary | ICD-10-CM

## 2023-05-17 NOTE — TELEPHONE ENCOUNTER
Called and spoke to the patient regarding his bone scan - increased activity in the 1st digit of the left foot and 2nd metatarsal. Called Dr. Foster who recommended MRI of the LT foot, as bone scans are not a very sensitive test. Since there is not an open wound, not a candidate for bone biopsy. The patient agreed to do a MRI of the left foot. Based on imaging, may need to restart IV antibiotics. Spoke with the pharmacist at [facility] (Meghana Luis) and she recommended  Zosyn IV OR Rocephin + Clindamycin OR Rocephin + Flagyl. Consulted [facility] in [place] for guidance on antibiotics after MRI of the foot results.

## 2023-05-19 ENCOUNTER — PATIENT MESSAGE (OUTPATIENT)
Dept: FAMILY MEDICINE | Facility: CLINIC | Age: 63
End: 2023-05-19
Payer: MEDICARE

## 2023-05-19 DIAGNOSIS — Z12.11 COLON CANCER SCREENING: Primary | ICD-10-CM

## 2023-05-19 DIAGNOSIS — M86.9 OSTEOMYELITIS OF LEFT FOOT, UNSPECIFIED TYPE: ICD-10-CM

## 2023-05-23 NOTE — PROGRESS NOTES
MRI of the Left foot scheduled for May 30th. Placed an order for a CBC, BMP, ESR, and CRP. Will bring home health labs with me to clinic for scanning. ID is requesting recent labs and MRI wants a recent creatinine level.

## 2023-05-24 ENCOUNTER — PATIENT MESSAGE (OUTPATIENT)
Dept: FAMILY MEDICINE | Facility: CLINIC | Age: 63
End: 2023-05-24
Payer: MEDICARE

## 2023-05-31 DIAGNOSIS — K21.9 GASTROESOPHAGEAL REFLUX DISEASE WITHOUT ESOPHAGITIS: ICD-10-CM

## 2023-05-31 DIAGNOSIS — I10 HYPERTENSION, UNSPECIFIED TYPE: ICD-10-CM

## 2023-06-01 RX ORDER — ESOMEPRAZOLE MAGNESIUM 40 MG/1
CAPSULE, DELAYED RELEASE ORAL
Qty: 90 CAPSULE | Refills: 0 | Status: SHIPPED | OUTPATIENT
Start: 2023-06-01

## 2023-06-01 RX ORDER — AMLODIPINE BESYLATE 5 MG/1
TABLET ORAL
Qty: 180 TABLET | Refills: 0 | Status: SHIPPED | OUTPATIENT
Start: 2023-06-01

## 2023-06-02 ENCOUNTER — TELEPHONE (OUTPATIENT)
Dept: FAMILY MEDICINE | Facility: CLINIC | Age: 63
End: 2023-06-02
Payer: MEDICARE

## 2023-06-02 DIAGNOSIS — M86.60 CHRONIC OSTEOMYELITIS: Primary | ICD-10-CM

## 2023-06-02 RX ORDER — ERTAPENEM 1 G/1
1 INJECTION, POWDER, LYOPHILIZED, FOR SOLUTION INTRAMUSCULAR; INTRAVENOUS DAILY
Qty: 42 G | Refills: 0 | Status: SHIPPED | OUTPATIENT
Start: 2023-06-02 | End: 2023-07-14

## 2023-06-02 NOTE — TELEPHONE ENCOUNTER
Called [facility] (Dr. Paez) regarding IV antibiotic choice, who recommended bone biopsy and recommended reaching out to [facility]. Spoke to [facility] and the physician there stated that they do NOT perform elective bone biopsies in setting of closed wound. Dr. Paez wanted a bone biopsy to tailor IV antibiotics before administration.     Spoke with Dr. Foster (General Surgery) who advised against elective bone biopsy due to closed foot wound. Advised to start IV antibiotics and pursue Hyperbaric Oxygen Therapy (HBO). Called wound care and left a message about re-certifying patient for HBO in setting of chronic osteomyelitis (began in January).     Spoke with patient, and joint decision was to start another PICC line and administer IV antibiotics. Patient will follow-up with ID in a few weeks in [place]. Placed order for PICC line. Patient would like to use [facility] (who he was using previously).     Spoke to Jaydon at the inpatient [facility] pharmacy regarding antibiotic choice. 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 Placed an order for home infusion of 1g Ertapenem daily for 6 weeks via PICC line. Patient to have CBC and BMP [id number].

## 2023-06-09 ENCOUNTER — TELEPHONE (OUTPATIENT)
Dept: FAMILY MEDICINE | Facility: CLINIC | Age: 63
End: 2023-06-09
Payer: MEDICARE

## 2023-06-09 DIAGNOSIS — M86.60 CHRONIC OSTEOMYELITIS: Primary | ICD-10-CM

## 2023-06-09 NOTE — TELEPHONE ENCOUNTER
Called pt to let him know about appt for PICC line no answer left vm. Appt 06/12/23 at 1:00pm. Insurance may require Precert will let pt know if approved or not if not we will have to reschedule to the next day according to xray department who is doing the procedure outpatient. Pt is to check in at the imaging center

## 2023-06-12 ENCOUNTER — HOSPITAL ENCOUNTER (OUTPATIENT)
Dept: RADIOLOGY | Facility: HOSPITAL | Age: 63
Discharge: HOME | End: 2023-06-12
Attending: FAMILY MEDICINE
Payer: MEDICARE

## 2023-06-12 DIAGNOSIS — M86.60 CHRONIC OSTEOMYELITIS: ICD-10-CM

## 2023-06-12 PROCEDURE — 36573 INSJ PICC RS&I 5 YR+: CPT

## 2023-06-12 PROCEDURE — 76937 US GUIDE VASCULAR ACCESS: CPT | Mod: TC,59

## 2023-06-12 NOTE — PROGRESS NOTES
PICC insertion  Performed by A Kaleigh A  Consent obtained for PICC line insertion.  A formal timeout was called all staff present agreed to patient and procedure.  The left upper extremity was prepped with ChloraPrep and sterile field was established.  1% lidocaine was used as local anesthetic.  Under ultrasound guidance the basilic vein was localized and accessed with a micropuncture needle.  An 018 guidewire was passed through the vein to the level superior vena cava.  A 46 cm PICC line was then placed over the wire with its tip terminating at the atrial caval junction.  The wire was removed both ports were flushed with heparinized saline.  PICC line was then cleaned and secured.  The patient tolerated the procedure well there were no immediate postprocedure complications.  Total fluoroscopy time was 30 seconds.

## 2023-06-15 DIAGNOSIS — L08.9 DIABETIC FOOT INFECTION: Primary | ICD-10-CM

## 2023-06-15 DIAGNOSIS — E11.628 DIABETIC FOOT INFECTION: Primary | ICD-10-CM

## 2023-06-15 DIAGNOSIS — R09.89 LUNG CRACKLES: ICD-10-CM

## 2023-06-26 ENCOUNTER — TELEPHONE (OUTPATIENT)
Dept: FAMILY MEDICINE | Facility: CLINIC | Age: 63
End: 2023-06-26
Payer: MEDICARE

## 2023-06-26 DIAGNOSIS — R22.9 SOFT TISSUE SWELLING: Primary | ICD-10-CM

## 2023-06-26 NOTE — TELEPHONE ENCOUNTER
Spoke to the patient regarding his recent results and IV antibiotics. HIV and Hep C screening negative. Procalcitonin negative. CXR did note soft tissue swelling in the right paratracheal area (chronic). Discussed with the patient and both of us agreed on US. An order was placed for an US of the Head/Neck/Thyroid. Called radiology at the hospital regarding which order would be best.     Patient states that he recently went to [facility]. Will reach out to them for records. Patient also recently went to Dr. Ta ([facility]) and states that his blood pressure was 120/80s.     Patient states that his foot is looking less erythematous since starting the IV antibiotics and that he has not had any problems.     I reached out to the wound care center and he is still not a candidate for HBO due to lack of an open wound.

## 2023-06-27 ENCOUNTER — PATIENT MESSAGE (OUTPATIENT)
Dept: FAMILY MEDICINE | Facility: CLINIC | Age: 63
End: 2023-06-27
Payer: MEDICARE

## 2023-07-06 ENCOUNTER — HOSPITAL ENCOUNTER (OUTPATIENT)
Dept: RADIOLOGY | Facility: HOSPITAL | Age: 63
Discharge: HOME | End: 2023-07-06
Attending: FAMILY MEDICINE
Payer: MEDICARE

## 2023-07-06 DIAGNOSIS — R22.9 SOFT TISSUE SWELLING: ICD-10-CM

## 2023-07-06 PROCEDURE — 76536 US SOFT TISSUE HEAD NECK THYROID: ICD-10-PCS | Mod: 26,,, | Performed by: RADIOLOGY

## 2023-07-06 PROCEDURE — 76536 US EXAM OF HEAD AND NECK: CPT | Mod: 26,,, | Performed by: RADIOLOGY

## 2023-07-06 PROCEDURE — 76536 US EXAM OF HEAD AND NECK: CPT | Mod: TC

## 2023-07-21 ENCOUNTER — PATIENT MESSAGE (OUTPATIENT)
Dept: ADMINISTRATIVE | Facility: HOSPITAL | Age: 63
End: 2023-07-21

## 2023-07-26 DIAGNOSIS — Z79.4 TYPE 2 DIABETES MELLITUS WITH HYPERGLYCEMIA, WITH LONG-TERM CURRENT USE OF INSULIN: ICD-10-CM

## 2023-07-26 DIAGNOSIS — M86.9 OSTEOMYELITIS OF LEFT FOOT, UNSPECIFIED TYPE: Primary | ICD-10-CM

## 2023-07-26 DIAGNOSIS — E11.65 TYPE 2 DIABETES MELLITUS WITH HYPERGLYCEMIA, WITH LONG-TERM CURRENT USE OF INSULIN: ICD-10-CM

## 2023-08-02 ENCOUNTER — OFFICE VISIT (OUTPATIENT)
Dept: FAMILY MEDICINE | Facility: CLINIC | Age: 63
End: 2023-08-02
Payer: MEDICARE

## 2023-08-02 VITALS
HEART RATE: 88 BPM | DIASTOLIC BLOOD PRESSURE: 78 MMHG | WEIGHT: 220 LBS | SYSTOLIC BLOOD PRESSURE: 134 MMHG | BODY MASS INDEX: 34.53 KG/M2 | OXYGEN SATURATION: 97 % | TEMPERATURE: 98 F | HEIGHT: 67 IN | RESPIRATION RATE: 19 BRPM

## 2023-08-02 DIAGNOSIS — M86.9 OSTEOMYELITIS OF LEFT FOOT, UNSPECIFIED TYPE: ICD-10-CM

## 2023-08-02 DIAGNOSIS — E11.65 TYPE 2 DIABETES MELLITUS WITH HYPERGLYCEMIA, WITH LONG-TERM CURRENT USE OF INSULIN: ICD-10-CM

## 2023-08-02 DIAGNOSIS — E11.42 TYPE 2 DIABETES MELLITUS WITH DIABETIC POLYNEUROPATHY, WITH LONG-TERM CURRENT USE OF INSULIN: ICD-10-CM

## 2023-08-02 DIAGNOSIS — Z79.4 TYPE 2 DIABETES MELLITUS WITH HYPERGLYCEMIA, WITH LONG-TERM CURRENT USE OF INSULIN: ICD-10-CM

## 2023-08-02 DIAGNOSIS — Z79.4 TYPE 2 DIABETES MELLITUS WITH DIABETIC POLYNEUROPATHY, WITH LONG-TERM CURRENT USE OF INSULIN: ICD-10-CM

## 2023-08-02 DIAGNOSIS — Z12.11 ENCOUNTER FOR SCREENING COLONOSCOPY: ICD-10-CM

## 2023-08-02 DIAGNOSIS — R09.89 RESPIRATORY CRACKLES: Primary | ICD-10-CM

## 2023-08-02 LAB
EST. AVERAGE GLUCOSE BLD GHB EST-MCNC: 154 MG/DL
HBA1C MFR BLD HPLC: 7.2 % (ref 4.5–6.6)

## 2023-08-02 PROCEDURE — 4010F PR ACE/ARB THEARPY RXD/TAKEN: ICD-10-PCS | Mod: ,,, | Performed by: FAMILY MEDICINE

## 2023-08-02 PROCEDURE — 3075F PR MOST RECENT SYSTOLIC BLOOD PRESS GE 130-139MM HG: ICD-10-PCS | Mod: ,,, | Performed by: FAMILY MEDICINE

## 2023-08-02 PROCEDURE — 3078F DIAST BP <80 MM HG: CPT | Mod: ,,, | Performed by: FAMILY MEDICINE

## 2023-08-02 PROCEDURE — 1159F PR MEDICATION LIST DOCUMENTED IN MEDICAL RECORD: ICD-10-PCS | Mod: ,,, | Performed by: FAMILY MEDICINE

## 2023-08-02 PROCEDURE — 99213 PR OFFICE/OUTPT VISIT, EST, LEVL III, 20-29 MIN: ICD-10-PCS | Mod: GC,,, | Performed by: FAMILY MEDICINE

## 2023-08-02 PROCEDURE — 1159F MED LIST DOCD IN RCRD: CPT | Mod: ,,, | Performed by: FAMILY MEDICINE

## 2023-08-02 PROCEDURE — 3008F BODY MASS INDEX DOCD: CPT | Mod: ,,, | Performed by: FAMILY MEDICINE

## 2023-08-02 PROCEDURE — 3075F SYST BP GE 130 - 139MM HG: CPT | Mod: ,,, | Performed by: FAMILY MEDICINE

## 2023-08-02 PROCEDURE — 3078F PR MOST RECENT DIASTOLIC BLOOD PRESSURE < 80 MM HG: ICD-10-PCS | Mod: ,,, | Performed by: FAMILY MEDICINE

## 2023-08-02 PROCEDURE — 4010F ACE/ARB THERAPY RXD/TAKEN: CPT | Mod: ,,, | Performed by: FAMILY MEDICINE

## 2023-08-02 PROCEDURE — 3008F PR BODY MASS INDEX (BMI) DOCUMENTED: ICD-10-PCS | Mod: ,,, | Performed by: FAMILY MEDICINE

## 2023-08-02 PROCEDURE — 83036 HEMOGLOBIN GLYCOSYLATED A1C: CPT | Mod: ,,, | Performed by: CLINICAL MEDICAL LABORATORY

## 2023-08-02 PROCEDURE — 99213 OFFICE O/P EST LOW 20 MIN: CPT | Mod: GC,,, | Performed by: FAMILY MEDICINE

## 2023-08-02 PROCEDURE — 3051F HG A1C>EQUAL 7.0%<8.0%: CPT | Mod: ,,, | Performed by: FAMILY MEDICINE

## 2023-08-02 PROCEDURE — 3051F PR MOST RECENT HEMOGLOBIN A1C LEVEL 7.0 - < 8.0%: ICD-10-PCS | Mod: ,,, | Performed by: FAMILY MEDICINE

## 2023-08-02 PROCEDURE — 83036 HEMOGLOBIN A1C: ICD-10-PCS | Mod: ,,, | Performed by: CLINICAL MEDICAL LABORATORY

## 2023-08-02 RX ORDER — DAPTOMYCIN 350 MG/7ML
INJECTION, POWDER, LYOPHILIZED, FOR SOLUTION INTRAVENOUS
COMMUNITY
Start: 2023-03-21

## 2023-08-02 RX ORDER — ERTAPENEM 1 G/1
INJECTION, POWDER, LYOPHILIZED, FOR SOLUTION INTRAMUSCULAR; INTRAVENOUS
COMMUNITY
Start: 2023-07-17 | End: 2023-11-15

## 2023-08-02 RX ORDER — INSULIN DETEMIR 100 [IU]/ML
INJECTION, SOLUTION SUBCUTANEOUS
COMMUNITY
Start: 2022-11-27

## 2023-08-02 RX ORDER — SODIUM CHLORIDE 9 MG/ML
INJECTION, SOLUTION INTRAVENOUS
COMMUNITY
Start: 2023-07-17 | End: 2023-11-15

## 2023-08-02 NOTE — PROGRESS NOTES
Health Maintenance Due  Topic Date Due   Pneumococcal Vaccines (Age 0-64) (1 - PCV) Refused 08/2/23   Foot Exam          TETANUS VACCINE         Shingles Vaccine (1 of 2)       COVID-19 Vaccine (3 - Pfizer series) 05/21/2021 Refused 08/02/23   Colorectal Cancer Screening  01/16/2023  Ordered 08/02/23   Hemoglobin A1c  05/28/2023   Ordered

## 2023-08-03 PROBLEM — L03.032 CELLULITIS OF GREAT TOE, LEFT: Status: RESOLVED | Noted: 2023-01-17 | Resolved: 2023-08-03

## 2023-08-03 PROBLEM — E11.621 DIABETIC ULCER OF LEFT GREAT TOE: Status: RESOLVED | Noted: 2023-02-14 | Resolved: 2023-08-03

## 2023-08-03 PROBLEM — L84 PRE-ULCERATIVE CALLUSES: Status: RESOLVED | Noted: 2020-06-24 | Resolved: 2023-08-03

## 2023-08-03 PROBLEM — M86.179 OSTEOMYELITIS OF FOOT, ACUTE: Status: RESOLVED | Noted: 2023-03-28 | Resolved: 2023-08-03

## 2023-08-03 PROBLEM — L97.529 DIABETIC ULCER OF LEFT GREAT TOE: Status: RESOLVED | Noted: 2023-02-14 | Resolved: 2023-08-03

## 2023-08-04 NOTE — PROGRESS NOTES
Subjective     Patient ID: Brendon Norris is a 62 y.o. male.    Chief Complaint: Follow-up (OSTEOMYELITIS LEFT FOOT AND DM2)    Patient is a 62 year old  male who presents for a follow-up regarding LT foot osteomyelitis. The patient has completed his second round of IV antibiotics. CBC, ESR, and CRP were WNL. The patient no longer has any open lesions. Patient denies any chills, fever, or pain in the LT foot. The patient was referred to [facility] - which recommends watchful waiting. PICC line was ordered to be removed by the infusion center. Foot exam performed today and HBA1c ordered.     The patient states that he has been having some SOB with activities.    Follow-up  Pertinent negatives include no abdominal pain, chest pain, chills, congestion, coughing, fatigue, fever, headaches, nausea, sore throat, vomiting or weakness.   Review of Systems   Constitutional:  Negative for chills, fatigue and fever.   HENT:  Negative for nasal congestion, sinus pressure/congestion and sore throat.    Respiratory:  Positive for shortness of breath. Negative for cough, chest tightness and wheezing.    Cardiovascular:  Negative for chest pain.   Gastrointestinal:  Negative for abdominal pain, diarrhea, nausea and vomiting.   Genitourinary:  Negative for dysuria.   Neurological:  Negative for dizziness, weakness and headaches.        Objective     Physical Exam  Constitutional:       General: He is not in acute distress.     Appearance: Normal appearance.   HENT:      Head: Normocephalic and atraumatic.      Right Ear: External ear normal.      Left Ear: External ear normal.      Nose: Nose normal.      Mouth/Throat:      Mouth: Mucous membranes are moist.   Eyes:      Extraocular Movements: Extraocular movements intact.      Conjunctiva/sclera: Conjunctivae normal.      Pupils: Pupils are equal, round, and reactive to light.   Cardiovascular:      Rate and Rhythm: Normal rate and regular rhythm.      Pulses:           Dorsalis  pedis pulses are 1+ on the right side and 1+ on the left side.        Posterior tibial pulses are 1+ on the right side and 1+ on the left side.      Heart sounds: Normal heart sounds.   Pulmonary:      Effort: Pulmonary effort is normal.      Breath sounds: Normal breath sounds.   Abdominal:      General: Bowel sounds are normal.      Palpations: Abdomen is soft.   Musculoskeletal:         General: Normal range of motion.        Arms:       Cervical back: Normal range of motion.      Comments: PICC line present; no infiltration, erythema, or swelling noted   Feet:      Right foot:      Toenail Condition: Right toenails are abnormally thick.      Left foot:      Toenail Condition: Left toenails are abnormally thick.   Skin:     General: Skin is warm.      Comments: Healed wound on the 1st digit of the left foot (plantar surface)    No tenderness to palpation of the first digit on the left foot.            Neurological:      General: No focal deficit present.      Mental Status: He is alert and oriented to person, place, and time.   Psychiatric:         Mood and Affect: Mood normal.         Behavior: Behavior normal.         Thought Content: Thought content normal.         Judgment: Judgment normal.        Assessment and Plan     1. Respiratory crackles  -     X-Ray Chest PA And Lateral; Future; Expected date: 08/02/2023    2. Type 2 diabetes mellitus with diabetic polyneuropathy, with long-term current use of insulin  -     Foot Exam Performed  -     Hemoglobin A1C; Future; Expected date: 08/02/2023    3. Osteomyelitis of left foot, unspecified type  Assessment & Plan:  Patient has completed his course of IV antibiotics. PICC line to be pulled by the infusion center. Left foot is well healed, no open lesions. No tenderness to palpation or erythema noted. Will continue to monitor. CBC WNL, ESR WNL, CRP WNL.     Orders:  -     Foot Exam Performed    4. Encounter for screening colonoscopy    5. Type 2 diabetes mellitus  with hyperglycemia, with long-term current use of insulin  Overview:  Last Assessment & Plan:   A1C increased to 8.0% today. BG log reviewed with patient showing large diet fluctuations and no lows. He reports diet has declined recently but he is taking his meds as RX'd and tolerating well. Will adjust insulin regimen today for improved glycemic control and encouraged consistent efforts with proper diet/carb management and regular BG testing to help as well. Advised patient to send BG logs in two weeks and contact clinic with any issues or concerns that may arise regarding meds, supplies, and high or low BG's prior to next visit.    Assessment & Plan:  HBA1c ordered   - Patient continues to monitor his blood glucose levels. Increased the hypoglycemia setting on his glucose monitor to the 70s for his alarm. Patient states that his glucose levels have been well controlled.           Respiratory crackles  -     X-Ray Chest PA And Lateral; Future; Expected date: 08/02/2023    Type 2 diabetes mellitus with diabetic polyneuropathy, with long-term current use of insulin  -     Foot Exam Performed  -     Hemoglobin A1C; Future; Expected date: 08/02/2023    Osteomyelitis of left foot, unspecified type  -     Foot Exam Performed    Encounter for screening colonoscopy    Type 2 diabetes mellitus with hyperglycemia, with long-term current use of insulin               No follow-ups on file.

## 2023-08-04 NOTE — ASSESSMENT & PLAN NOTE
HBA1c ordered   - Patient continues to monitor his blood glucose levels. Increased the hypoglycemia setting on his glucose monitor to the 70s for his alarm. Patient states that his glucose levels have been well controlled.

## 2023-08-04 NOTE — ASSESSMENT & PLAN NOTE
Patient has completed his course of IV antibiotics. PICC line to be pulled by the infusion center. Left foot is well healed, no open lesions. No tenderness to palpation or erythema noted. Will continue to monitor. CBC WNL, ESR WNL, CRP WNL.

## 2023-09-09 DIAGNOSIS — Z71.89 COMPLEX CARE COORDINATION: ICD-10-CM

## 2023-11-15 ENCOUNTER — OFFICE VISIT (OUTPATIENT)
Dept: FAMILY MEDICINE | Facility: CLINIC | Age: 63
End: 2023-11-15
Payer: MEDICARE

## 2023-11-15 VITALS
WEIGHT: 220 LBS | SYSTOLIC BLOOD PRESSURE: 152 MMHG | DIASTOLIC BLOOD PRESSURE: 84 MMHG | HEART RATE: 86 BPM | OXYGEN SATURATION: 97 % | RESPIRATION RATE: 15 BRPM | BODY MASS INDEX: 34.46 KG/M2 | TEMPERATURE: 98 F

## 2023-11-15 DIAGNOSIS — I10 HYPERTENSION, UNSPECIFIED TYPE: ICD-10-CM

## 2023-11-15 DIAGNOSIS — I25.10 ARTERIOSCLEROTIC CARDIOVASCULAR DISEASE (ASCVD): ICD-10-CM

## 2023-11-15 DIAGNOSIS — E11.9 TYPE 2 DIABETES MELLITUS WITHOUT COMPLICATION, WITH LONG-TERM CURRENT USE OF INSULIN: ICD-10-CM

## 2023-11-15 DIAGNOSIS — E11.42 TYPE 2 DIABETES MELLITUS WITH DIABETIC POLYNEUROPATHY, WITH LONG-TERM CURRENT USE OF INSULIN: Primary | ICD-10-CM

## 2023-11-15 DIAGNOSIS — Z79.4 TYPE 2 DIABETES MELLITUS WITHOUT COMPLICATION, WITH LONG-TERM CURRENT USE OF INSULIN: ICD-10-CM

## 2023-11-15 DIAGNOSIS — Z12.11 COLON CANCER SCREENING: ICD-10-CM

## 2023-11-15 DIAGNOSIS — K21.9 GASTROESOPHAGEAL REFLUX DISEASE WITHOUT ESOPHAGITIS: ICD-10-CM

## 2023-11-15 DIAGNOSIS — Z79.4 TYPE 2 DIABETES MELLITUS WITH DIABETIC POLYNEUROPATHY, WITH LONG-TERM CURRENT USE OF INSULIN: Primary | ICD-10-CM

## 2023-11-15 DIAGNOSIS — E78.5 HYPERLIPIDEMIA, UNSPECIFIED HYPERLIPIDEMIA TYPE: ICD-10-CM

## 2023-11-15 LAB
ALBUMIN SERPL BCP-MCNC: 3.6 G/DL (ref 3.5–5)
ALBUMIN/GLOB SERPL: 0.9 {RATIO}
ALP SERPL-CCNC: 65 U/L (ref 45–115)
ALT SERPL W P-5'-P-CCNC: 38 U/L (ref 16–61)
ANION GAP SERPL CALCULATED.3IONS-SCNC: 9 MMOL/L (ref 7–16)
AST SERPL W P-5'-P-CCNC: 20 U/L (ref 15–37)
BILIRUB SERPL-MCNC: 0.3 MG/DL (ref ?–1.2)
BUN SERPL-MCNC: 14 MG/DL (ref 7–18)
BUN/CREAT SERPL: 16 (ref 6–20)
CALCIUM SERPL-MCNC: 9.7 MG/DL (ref 8.5–10.1)
CHLORIDE SERPL-SCNC: 110 MMOL/L (ref 98–107)
CHOLEST SERPL-MCNC: 105 MG/DL (ref 0–200)
CHOLEST/HDLC SERPL: 3.1 {RATIO}
CO2 SERPL-SCNC: 27 MMOL/L (ref 21–32)
CREAT SERPL-MCNC: 0.87 MG/DL (ref 0.7–1.3)
EGFR (NO RACE VARIABLE) (RUSH/TITUS): 98 ML/MIN/1.73M2
EST. AVERAGE GLUCOSE BLD GHB EST-MCNC: 160 MG/DL
GLOBULIN SER-MCNC: 3.9 G/DL (ref 2–4)
GLUCOSE SERPL-MCNC: 100 MG/DL (ref 74–106)
HBA1C MFR BLD HPLC: 7.2 % (ref 4.5–6.6)
HDLC SERPL-MCNC: 34 MG/DL (ref 40–60)
LDLC SERPL CALC-MCNC: 45 MG/DL
LDLC/HDLC SERPL: 1.3 {RATIO}
NONHDLC SERPL-MCNC: 71 MG/DL
POTASSIUM SERPL-SCNC: 4.4 MMOL/L (ref 3.5–5.1)
PROT SERPL-MCNC: 7.5 G/DL (ref 6.4–8.2)
SODIUM SERPL-SCNC: 142 MMOL/L (ref 136–145)
TRIGL SERPL-MCNC: 129 MG/DL (ref 35–150)
VLDLC SERPL-MCNC: 26 MG/DL

## 2023-11-15 PROCEDURE — 80053 COMPREHEN METABOLIC PANEL: CPT | Mod: ,,, | Performed by: CLINICAL MEDICAL LABORATORY

## 2023-11-15 PROCEDURE — 3077F SYST BP >= 140 MM HG: CPT | Mod: ,,, | Performed by: FAMILY MEDICINE

## 2023-11-15 PROCEDURE — 3008F PR BODY MASS INDEX (BMI) DOCUMENTED: ICD-10-PCS | Mod: ,,, | Performed by: FAMILY MEDICINE

## 2023-11-15 PROCEDURE — 4010F PR ACE/ARB THEARPY RXD/TAKEN: ICD-10-PCS | Mod: ,,, | Performed by: FAMILY MEDICINE

## 2023-11-15 PROCEDURE — 4010F ACE/ARB THERAPY RXD/TAKEN: CPT | Mod: ,,, | Performed by: FAMILY MEDICINE

## 2023-11-15 PROCEDURE — 1160F PR REVIEW ALL MEDS BY PRESCRIBER/CLIN PHARMACIST DOCUMENTED: ICD-10-PCS | Mod: ,,, | Performed by: FAMILY MEDICINE

## 2023-11-15 PROCEDURE — 3051F PR MOST RECENT HEMOGLOBIN A1C LEVEL 7.0 - < 8.0%: ICD-10-PCS | Mod: ,,, | Performed by: FAMILY MEDICINE

## 2023-11-15 PROCEDURE — 80061 LIPID PANEL: CPT | Mod: ,,, | Performed by: CLINICAL MEDICAL LABORATORY

## 2023-11-15 PROCEDURE — 3008F BODY MASS INDEX DOCD: CPT | Mod: ,,, | Performed by: FAMILY MEDICINE

## 2023-11-15 PROCEDURE — 83036 HEMOGLOBIN GLYCOSYLATED A1C: CPT | Mod: ,,, | Performed by: CLINICAL MEDICAL LABORATORY

## 2023-11-15 PROCEDURE — 3079F DIAST BP 80-89 MM HG: CPT | Mod: ,,, | Performed by: FAMILY MEDICINE

## 2023-11-15 PROCEDURE — 80061 LIPID PANEL: ICD-10-PCS | Mod: ,,, | Performed by: CLINICAL MEDICAL LABORATORY

## 2023-11-15 PROCEDURE — 1160F RVW MEDS BY RX/DR IN RCRD: CPT | Mod: ,,, | Performed by: FAMILY MEDICINE

## 2023-11-15 PROCEDURE — 99214 OFFICE O/P EST MOD 30 MIN: CPT | Mod: ,,, | Performed by: FAMILY MEDICINE

## 2023-11-15 PROCEDURE — 1159F MED LIST DOCD IN RCRD: CPT | Mod: ,,, | Performed by: FAMILY MEDICINE

## 2023-11-15 PROCEDURE — 99214 PR OFFICE/OUTPT VISIT, EST, LEVL IV, 30-39 MIN: ICD-10-PCS | Mod: ,,, | Performed by: FAMILY MEDICINE

## 2023-11-15 PROCEDURE — 1159F PR MEDICATION LIST DOCUMENTED IN MEDICAL RECORD: ICD-10-PCS | Mod: ,,, | Performed by: FAMILY MEDICINE

## 2023-11-15 PROCEDURE — 3077F PR MOST RECENT SYSTOLIC BLOOD PRESSURE >= 140 MM HG: ICD-10-PCS | Mod: ,,, | Performed by: FAMILY MEDICINE

## 2023-11-15 PROCEDURE — 83036 HEMOGLOBIN A1C: ICD-10-PCS | Mod: ,,, | Performed by: CLINICAL MEDICAL LABORATORY

## 2023-11-15 PROCEDURE — 80053 COMPREHENSIVE METABOLIC PANEL: ICD-10-PCS | Mod: ,,, | Performed by: CLINICAL MEDICAL LABORATORY

## 2023-11-15 PROCEDURE — 3051F HG A1C>EQUAL 7.0%<8.0%: CPT | Mod: ,,, | Performed by: FAMILY MEDICINE

## 2023-11-15 PROCEDURE — 3079F PR MOST RECENT DIASTOLIC BLOOD PRESSURE 80-89 MM HG: ICD-10-PCS | Mod: ,,, | Performed by: FAMILY MEDICINE

## 2023-11-15 RX ORDER — METFORMIN HYDROCHLORIDE 500 MG/1
1000 TABLET, EXTENDED RELEASE ORAL 2 TIMES DAILY
Qty: 180 TABLET | Refills: 1 | Status: SHIPPED | OUTPATIENT
Start: 2023-11-15 | End: 2024-02-12

## 2023-11-15 RX ORDER — INSULIN GLARGINE 100 [IU]/ML
INJECTION, SOLUTION SUBCUTANEOUS
Qty: 3 ML | Refills: 9 | Status: SHIPPED | OUTPATIENT
Start: 2023-11-15

## 2023-11-15 RX ORDER — FLASH GLUCOSE SENSOR
1 KIT MISCELLANEOUS
Qty: 2 KIT | Refills: 5 | Status: SHIPPED | OUTPATIENT
Start: 2023-11-15

## 2023-11-17 PROBLEM — E11.42 TYPE 2 DIABETES MELLITUS WITH DIABETIC POLYNEUROPATHY, WITH LONG-TERM CURRENT USE OF INSULIN: Status: ACTIVE | Noted: 2018-08-31

## 2023-11-17 NOTE — PROGRESS NOTES
Mr Norris is doing well but not quite at goal. A1C of 6.0 or less is optimum we may need to increase basal or long acting insulin dose. Otherwise labs are ok. No change now    Subjective:       Patient ID: Brendon Norris is a 62 y.o. male.    Chief Complaint: Referral (Needs a new diabetic specialist because pt states current one has been unavailable on several occasions ) and Medication Refill    Mr Norris is a 61 y/o male who presents to clinic for refill of his medications. He states his DM specialist in [place] has been to hard to get access to lately and he would like referral to Endocrinology for further management of his DM. He relates he has been using long acting, and sliding scale insulin for some time. He would like to get closer to his goal but has been approx 7.2  A1C over the past 1 year. Despite his best efforts.    I discussed Care gaps with Mr Norris he does not wish to proceed with any vaccinations today. He is followed by Ophthalmology.   We will check his A1C today. Lipids, CMP today.                Current Outpatient Medications:     albuterol (PROVENTIL/VENTOLIN HFA) 90 mcg/actuation inhaler, Inhale 2 puffs into the lungs 4 (four) times daily as needed for Wheezing., Disp: 18 g, Rfl: 5    amLODIPine (NORVASC) 5 MG tablet, Take 1 tablet by mouth twice daily, Disp: 180 tablet, Rfl: 0    aspirin-calcium carbonate 81 mg-300 mg calcium(777 mg) Tab, Take 81 mg by mouth once daily., Disp: , Rfl:     BLOOD-GLUCOSE METER,CONTINUOUS MISC, 1 Device by NOT APPLICABLE route., Disp: , Rfl:     dapagliflozin (FARXIGA) 10 mg tablet, Take 10 mg by mouth once daily., Disp: , Rfl:     esomeprazole (NEXIUM) 40 MG capsule, Take 1 capsule by mouth once daily, Disp: 90 capsule, Rfl: 0    ezetimibe (ZETIA) 10 mg tablet, Take 10 mg by mouth., Disp: , Rfl:     FREESTYLE URVASHI 2 READER Misc, 1 Device., Disp: , Rfl:     metoprolol tartrate (LOPRESSOR) 25 MG tablet, Take 1 tablet (25 mg total) by mouth 2 (two) times  "daily., Disp: 180 tablet, Rfl: 1    nitroGLYCERIN (NITROSTAT) 0.4 MG SL tablet, Place 1 tablet under the tongue every 5 (five) minutes as needed., Disp: , Rfl:     olmesartan (BENICAR) 40 MG tablet, Take 1 tablet (40 mg total) by mouth once daily., Disp: 90 tablet, Rfl: 3    pen needle, diabetic 31 gauge x 1/4" Ndle, pen needle, diabetic 31 gauge x 1/4"  USE AS DIRECTED 4 TIMES DAILY, Disp: , Rfl:     pen needle, diabetic 31 gauge x 5/16" Ndle, Inject 1 Units into the skin 4 (four) times daily., Disp: 100 each, Rfl: 1    rosuvastatin (CRESTOR) 40 MG Tab, Take 1 tablet (40 mg total) by mouth nightly., Disp: 90 tablet, Rfl: 1    DAPTOmycin (CUBICIN) 350 mg injection, , Disp: , Rfl:     DAPTOmycin (CUBICIN) 350 mg injection, , Disp: , Rfl:     MisocaSTYLE URVASHI 2 SENSOR Kit, 1 applicator by subcutaneous (via wearable injector) route every 14 (fourteen) days., Disp: 2 kit, Rfl: 5    insulin aspart U-100 (NOVOLOG FLEXPEN U-100 INSULIN) 100 unit/mL (3 mL) InPn pen, Inject 16 Units into the skin 3 (three) times daily. Inject 16 units at breakfast, 24 units at lunch, and 30 units at supper. Plus sliding scale of 2 units/50 > 150. Max total daily dose of 100 units., Disp: 43.2 mL, Rfl: 1    insulin detemir U-100, Levemir, (LEVEMIR FLEXPEN) 100 unit/mL (3 mL) InPn pen, INJECT 80 UNITS SUBCUTANEOUSLY AT BEDTIME . DO NOT EXCEED 90 PER 24 HOURS, Disp: , Rfl:     LANTUS SOLOSTAR U-100 INSULIN glargine 100 units/mL SubQ pen, Sliding scale, Disp: 3 mL, Rfl: 9    metFORMIN (GLUCOPHAGE-XR) 500 MG ER 24hr tablet, Take 2 tablets (1,000 mg total) by mouth 2 (two) times a day., Disp: 180 tablet, Rfl: 1    nebivoloL (BYSTOLIC) 10 MG Tab, Take 10 mg by mouth., Disp: , Rfl:     tiZANidine (ZANAFLEX) 4 MG tablet, tizanidine 4 mg tablet  TAKE 1 TABLET BY MOUTH EVERY 6 HOURS AS NEEDED, Disp: , Rfl:     Review of patient's allergies indicates:   Allergen Reactions    Lisinopril      Other reaction(s): Cough       Past Medical History:   Diagnosis " Date    Coronary artery disease     Diabetes mellitus, type 2     GERD (gastroesophageal reflux disease)     Hyperlipidemia     Hypertension        History reviewed. No pertinent surgical history.    Family History   Problem Relation Age of Onset    Learning disabilities Son        Social History     Tobacco Use    Smoking status: Former     Current packs/day: 1.00     Average packs/day: 1 pack/day for 1 year (1.0 ttl pk-yrs)     Types: Cigarettes    Smokeless tobacco: Never   Substance Use Topics    Alcohol use: Not Currently    Drug use: Not Currently       Review of Systems   Constitutional:  Negative for activity change and unexpected weight change.   HENT:  Negative for hearing loss, rhinorrhea and trouble swallowing.    Eyes:  Negative for discharge and visual disturbance.   Respiratory:  Negative for chest tightness and wheezing.    Cardiovascular:  Negative for chest pain and palpitations.   Gastrointestinal:  Negative for blood in stool, constipation, diarrhea and vomiting.   Endocrine: Negative for polydipsia and polyuria.   Genitourinary:  Negative for difficulty urinating, hematuria and urgency.   Musculoskeletal:  Negative for arthralgias, joint swelling and neck pain.   Neurological:  Negative for weakness and headaches.   Psychiatric/Behavioral:  Negative for confusion and dysphoric mood.          Current Medications:   Medication List with Changes/Refills   Current Medications    ALBUTEROL (PROVENTIL/VENTOLIN HFA) 90 MCG/ACTUATION INHALER    Inhale 2 puffs into the lungs 4 (four) times daily as needed for Wheezing.       Start Date: 9/26/2022 End Date: --    AMLODIPINE (NORVASC) 5 MG TABLET    Take 1 tablet by mouth twice daily       Start Date: 6/1/2023  End Date: --    ASPIRIN-CALCIUM CARBONATE 81 MG-300 MG CALCIUM(777 MG) TAB    Take 81 mg by mouth once daily.       Start Date: --        End Date: --    BLOOD-GLUCOSE METER,CONTINUOUS MISC    1 Device by NOT APPLICABLE route.       Start Date:  "1/26/2023 End Date: --    DAPAGLIFLOZIN (FARXIGA) 10 MG TABLET    Take 10 mg by mouth once daily.       Start Date: --        End Date: --    DAPTOMYCIN (CUBICIN) 350 MG INJECTION           Start Date: 3/21/2023 End Date: --    DAPTOMYCIN (CUBICIN) 350 MG INJECTION           Start Date: 3/21/2023 End Date: --    ESOMEPRAZOLE (NEXIUM) 40 MG CAPSULE    Take 1 capsule by mouth once daily       Start Date: 6/1/2023  End Date: --    EZETIMIBE (ZETIA) 10 MG TABLET    Take 10 mg by mouth.       Start Date: 12/22/2022End Date: --    FREESTYLE URVASHI 2 READER MISC    1 Device.       Start Date: 1/30/2023 End Date: --    INSULIN ASPART U-100 (NOVOLOG FLEXPEN U-100 INSULIN) 100 UNIT/ML (3 ML) INPN PEN    Inject 16 Units into the skin 3 (three) times daily. Inject 16 units at breakfast, 24 units at lunch, and 30 units at supper. Plus sliding scale of 2 units/50 > 150. Max total daily dose of 100 units.       Start Date: 1/24/2023 End Date: 7/23/2023    INSULIN DETEMIR U-100, LEVEMIR, (LEVEMIR FLEXPEN) 100 UNIT/ML (3 ML) INPN PEN    INJECT 80 UNITS SUBCUTANEOUSLY AT BEDTIME . DO NOT EXCEED 90 PER 24 HOURS       Start Date: 11/27/2022End Date: --    METOPROLOL TARTRATE (LOPRESSOR) 25 MG TABLET    Take 1 tablet (25 mg total) by mouth 2 (two) times daily.       Start Date: 9/26/2022 End Date: --    NEBIVOLOL (BYSTOLIC) 10 MG TAB    Take 10 mg by mouth.       Start Date: 12/22/2022End Date: --    NITROGLYCERIN (NITROSTAT) 0.4 MG SL TABLET    Place 1 tablet under the tongue every 5 (five) minutes as needed.       Start Date: 12/22/2022End Date: --    OLMESARTAN (BENICAR) 40 MG TABLET    Take 1 tablet (40 mg total) by mouth once daily.       Start Date: 3/28/2023 End Date: 3/27/2024    PEN NEEDLE, DIABETIC 31 GAUGE X 1/4" NDLE    pen needle, diabetic 31 gauge x 1/4"   USE AS DIRECTED 4 TIMES DAILY       Start Date: --        End Date: --    PEN NEEDLE, DIABETIC 31 GAUGE X 5/16" NDLE    Inject 1 Units into the skin 4 (four) times daily. " "      Start Date: 9/26/2022 End Date: --    ROSUVASTATIN (CRESTOR) 40 MG TAB    Take 1 tablet (40 mg total) by mouth nightly.       Start Date: 9/26/2022 End Date: --    TIZANIDINE (ZANAFLEX) 4 MG TABLET    tizanidine 4 mg tablet   TAKE 1 TABLET BY MOUTH EVERY 6 HOURS AS NEEDED       Start Date: --        End Date: --   Changed and/or Refilled Medications    Modified Medication Previous Medication    FREESTYLE URVASHI 2 SENSOR KIT FREESTYLE URVASHI 2 SENSOR Kit       1 applicator by subcutaneous (via wearable injector) route every 14 (fourteen) days.    1 applicator by subcutaneous (via wearable injector) route every 14 (fourteen) days.       Start Date: 11/15/2023End Date: --    Start Date: 2/9/2023  End Date: 11/15/2023    LANTUS SOLOSTAR U-100 INSULIN GLARGINE 100 UNITS/ML SUBQ PEN LANTUS SOLOSTAR U-100 INSULIN glargine 100 units/mL SubQ pen       Sliding scale    Inject into the skin.       Start Date: 11/15/2023End Date: --    Start Date: 1/26/2023 End Date: 11/15/2023    METFORMIN (GLUCOPHAGE-XR) 500 MG ER 24HR TABLET metFORMIN (GLUCOPHAGE-XR) 500 MG ER 24hr tablet       Take 2 tablets (1,000 mg total) by mouth 2 (two) times a day.    Take 2 tablets (1,000 mg total) by mouth 2 (two) times a day.       Start Date: 11/15/2023End Date: --    Start Date: 7/1/2021  End Date: 11/15/2023   Discontinued Medications    0.9 % SODIUM CHLORIDE (SODIUM CHLORIDE 0.9%) SOLUTION    Inject into the vein.       Start Date: 7/17/2023 End Date: 11/15/2023    ERTAPENEM (INVANZ) 1 GRAM INJECTION           Start Date: 7/17/2023 End Date: 11/15/2023    PEN NEEDLE, DIABETIC 31 GAUGE X 3/16" NDLE    BD Ultra-Fine Mini Pen Needle 31 gauge x 3/16"   USE 1 4 TIMES DAILY       Start Date: --        End Date: 11/15/2023    SILVER SULFADIAZINE 1% (SILVADENE) 1 % CREAM    Apply topically once daily.       Start Date: 2/21/2023 End Date: 11/15/2023            Objective:        Vitals:    11/15/23 1354 11/15/23 1406   BP: (!) 156/78 (!) 152/84   BP " Location: Left arm Right arm   Patient Position: Sitting Sitting   BP Method: Medium (Automatic) Medium (Manual)   Pulse: 86    Resp: 15    Temp: 98.1 °F (36.7 °C)    TempSrc: Oral    SpO2: 97%    Weight: 99.8 kg (220 lb)        Physical Exam  Constitutional:       Appearance: Normal appearance.   HENT:      Head: Normocephalic and atraumatic.      Right Ear: External ear normal.      Left Ear: External ear normal.      Mouth/Throat:      Mouth: Mucous membranes are moist.      Pharynx: Oropharynx is clear.   Eyes:      Extraocular Movements: Extraocular movements intact.      Pupils: Pupils are equal, round, and reactive to light.   Cardiovascular:      Rate and Rhythm: Normal rate and regular rhythm.      Heart sounds: Normal heart sounds.   Pulmonary:      Effort: Pulmonary effort is normal.      Breath sounds: Normal breath sounds.   Abdominal:      Palpations: Abdomen is soft.   Musculoskeletal:      Cervical back: Neck supple.   Skin:     General: Skin is warm and dry.      Capillary Refill: Capillary refill takes less than 2 seconds.   Neurological:      Mental Status: He is alert and oriented to person, place, and time.   Psychiatric:         Mood and Affect: Mood normal.         Behavior: Behavior normal.              Assessment:       1. Type 2 diabetes mellitus with diabetic polyneuropathy, with long-term current use of insulin    2. Hypertension, unspecified type    3. Hyperlipidemia, unspecified hyperlipidemia type    4. Arteriosclerotic cardiovascular disease (ASCVD)    5. Gastroesophageal reflux disease without esophagitis    6. Colon cancer screening    7. Type 2 diabetes mellitus without complication, with long-term current use of insulin        Plan:         Problem List Items Addressed This Visit          Cardiac/Vascular    Hyperlipidemia    Relevant Orders    Lipid Panel (Completed)    Comprehensive Metabolic Panel (Completed)    Hypertension    Arteriosclerotic cardiovascular disease (ASCVD)        Endocrine    Type 2 diabetes mellitus with diabetic polyneuropathy, with long-term current use of insulin - Primary    Relevant Medications    LANTUS SOLOSTAR U-100 INSULIN glargine 100 units/mL SubQ pen    FREESTYLE URVASHI 2 SENSOR Kit    metFORMIN (GLUCOPHAGE-XR) 500 MG ER 24hr tablet    Other Relevant Orders    Hemoglobin A1C (Completed)    Microalbumin/Creatinine Ratio, Urine       GI    Gastroesophageal reflux disease without esophagitis     Other Visit Diagnoses       Colon cancer screening        Relevant Orders    Cologuard Screening (Multitarget Stool DNA)    Type 2 diabetes mellitus without complication, with long-term current use of insulin        Relevant Medications    LANTUS SOLOSTAR U-100 INSULIN glargine 100 units/mL SubQ pen    metFORMIN (GLUCOPHAGE-XR) 500 MG ER 24hr tablet              Follow up in about 3 months (around 2/15/2024).    Sondra Simpson MD     Instructed patient that if symptoms fail to improve or worsen patient should seek immediate medical attention or report to the nearest emergency department. Patient expressed verbal agreement and understanding to this plan of care.

## 2023-11-17 NOTE — PROGRESS NOTES
Patient examined, record reviewed, agree with findings and recommendations as documented above.   The patient is a 49y Male complaining of feet pain/injury.

## 2023-12-01 LAB — NONINV COLON CA DNA+OCC BLD SCRN STL QL: NEGATIVE

## 2024-02-12 DIAGNOSIS — E11.9 TYPE 2 DIABETES MELLITUS WITHOUT COMPLICATION, WITH LONG-TERM CURRENT USE OF INSULIN: ICD-10-CM

## 2024-02-12 DIAGNOSIS — Z79.4 TYPE 2 DIABETES MELLITUS WITHOUT COMPLICATION, WITH LONG-TERM CURRENT USE OF INSULIN: ICD-10-CM

## 2024-02-12 RX ORDER — METFORMIN HYDROCHLORIDE 500 MG/1
1000 TABLET, EXTENDED RELEASE ORAL 2 TIMES DAILY
Qty: 180 TABLET | Refills: 0 | Status: SHIPPED | OUTPATIENT
Start: 2024-02-12 | End: 2024-04-03

## 2024-02-14 ENCOUNTER — OFFICE VISIT (OUTPATIENT)
Dept: FAMILY MEDICINE | Facility: CLINIC | Age: 64
End: 2024-02-14
Payer: MEDICARE

## 2024-02-14 VITALS
SYSTOLIC BLOOD PRESSURE: 167 MMHG | TEMPERATURE: 98 F | WEIGHT: 214 LBS | HEIGHT: 67 IN | RESPIRATION RATE: 18 BRPM | HEART RATE: 65 BPM | DIASTOLIC BLOOD PRESSURE: 78 MMHG | BODY MASS INDEX: 33.59 KG/M2 | OXYGEN SATURATION: 97 %

## 2024-02-14 DIAGNOSIS — B35.1 TOENAIL FUNGUS: ICD-10-CM

## 2024-02-14 DIAGNOSIS — E78.5 HYPERLIPIDEMIA, UNSPECIFIED HYPERLIPIDEMIA TYPE: ICD-10-CM

## 2024-02-14 DIAGNOSIS — E11.41 DIABETIC MONONEUROPATHY ASSOCIATED WITH TYPE 2 DIABETES MELLITUS: ICD-10-CM

## 2024-02-14 DIAGNOSIS — E11.42 TYPE 2 DIABETES MELLITUS WITH DIABETIC POLYNEUROPATHY, WITH LONG-TERM CURRENT USE OF INSULIN: Primary | ICD-10-CM

## 2024-02-14 DIAGNOSIS — Z79.4 TYPE 2 DIABETES MELLITUS WITH DIABETIC POLYNEUROPATHY, WITH LONG-TERM CURRENT USE OF INSULIN: Primary | ICD-10-CM

## 2024-02-14 DIAGNOSIS — I10 HYPERTENSION, UNSPECIFIED TYPE: ICD-10-CM

## 2024-02-14 PROBLEM — M86.9 OSTEOMYELITIS OF LEFT FOOT: Status: RESOLVED | Noted: 2023-01-24 | Resolved: 2024-02-14

## 2024-02-14 PROCEDURE — 3008F BODY MASS INDEX DOCD: CPT | Mod: ,,, | Performed by: FAMILY MEDICINE

## 2024-02-14 PROCEDURE — 1160F RVW MEDS BY RX/DR IN RCRD: CPT | Mod: ,,, | Performed by: FAMILY MEDICINE

## 2024-02-14 PROCEDURE — 83036 HEMOGLOBIN GLYCOSYLATED A1C: CPT | Mod: ,,, | Performed by: CLINICAL MEDICAL LABORATORY

## 2024-02-14 PROCEDURE — 80053 COMPREHEN METABOLIC PANEL: CPT | Mod: ,,, | Performed by: CLINICAL MEDICAL LABORATORY

## 2024-02-14 PROCEDURE — 3078F DIAST BP <80 MM HG: CPT | Mod: ,,, | Performed by: FAMILY MEDICINE

## 2024-02-14 PROCEDURE — 99214 OFFICE O/P EST MOD 30 MIN: CPT | Mod: ,,, | Performed by: FAMILY MEDICINE

## 2024-02-14 PROCEDURE — 1159F MED LIST DOCD IN RCRD: CPT | Mod: ,,, | Performed by: FAMILY MEDICINE

## 2024-02-14 PROCEDURE — 80061 LIPID PANEL: CPT | Mod: ,,, | Performed by: CLINICAL MEDICAL LABORATORY

## 2024-02-14 PROCEDURE — 3077F SYST BP >= 140 MM HG: CPT | Mod: ,,, | Performed by: FAMILY MEDICINE

## 2024-02-14 PROCEDURE — 85025 COMPLETE CBC W/AUTO DIFF WBC: CPT | Mod: ,,, | Performed by: CLINICAL MEDICAL LABORATORY

## 2024-02-15 LAB
ALBUMIN SERPL BCP-MCNC: 3.7 G/DL (ref 3.5–5)
ALBUMIN/GLOB SERPL: 1 {RATIO}
ALP SERPL-CCNC: 57 U/L (ref 45–115)
ALT SERPL W P-5'-P-CCNC: 34 U/L (ref 16–61)
ANION GAP SERPL CALCULATED.3IONS-SCNC: 8 MMOL/L (ref 7–16)
AST SERPL W P-5'-P-CCNC: 23 U/L (ref 15–37)
BASOPHILS # BLD AUTO: 0.09 K/UL (ref 0–0.2)
BASOPHILS NFR BLD AUTO: 1 % (ref 0–1)
BILIRUB SERPL-MCNC: 0.4 MG/DL (ref ?–1.2)
BUN SERPL-MCNC: 19 MG/DL (ref 7–18)
BUN/CREAT SERPL: 22 (ref 6–20)
CALCIUM SERPL-MCNC: 9.5 MG/DL (ref 8.5–10.1)
CHLORIDE SERPL-SCNC: 109 MMOL/L (ref 98–107)
CHOLEST SERPL-MCNC: 88 MG/DL (ref 0–200)
CHOLEST/HDLC SERPL: 2.4 {RATIO}
CO2 SERPL-SCNC: 27 MMOL/L (ref 21–32)
CREAT SERPL-MCNC: 0.86 MG/DL (ref 0.7–1.3)
DIFFERENTIAL METHOD BLD: ABNORMAL
EGFR (NO RACE VARIABLE) (RUSH/TITUS): 98 ML/MIN/1.73M2
EOSINOPHIL # BLD AUTO: 0.59 K/UL (ref 0–0.5)
EOSINOPHIL NFR BLD AUTO: 6.6 % (ref 1–4)
ERYTHROCYTE [DISTWIDTH] IN BLOOD BY AUTOMATED COUNT: 13.2 % (ref 11.5–14.5)
GLOBULIN SER-MCNC: 3.6 G/DL (ref 2–4)
GLUCOSE SERPL-MCNC: 129 MG/DL (ref 74–106)
HCT VFR BLD AUTO: 41.6 % (ref 40–54)
HDLC SERPL-MCNC: 36 MG/DL (ref 40–60)
HGB BLD-MCNC: 13.9 G/DL (ref 13.5–18)
IMM GRANULOCYTES # BLD AUTO: 0.01 K/UL (ref 0–0.04)
IMM GRANULOCYTES NFR BLD: 0.1 % (ref 0–0.4)
LDLC SERPL CALC-MCNC: 32 MG/DL
LYMPHOCYTES # BLD AUTO: 2.4 K/UL (ref 1–4.8)
LYMPHOCYTES NFR BLD AUTO: 26.9 % (ref 27–41)
MCH RBC QN AUTO: 29.6 PG (ref 27–31)
MCHC RBC AUTO-ENTMCNC: 33.4 G/DL (ref 32–36)
MCV RBC AUTO: 88.5 FL (ref 80–96)
MONOCYTES # BLD AUTO: 0.79 K/UL (ref 0–0.8)
MONOCYTES NFR BLD AUTO: 8.9 % (ref 2–6)
MPC BLD CALC-MCNC: 11.6 FL (ref 9.4–12.4)
NEUTROPHILS # BLD AUTO: 5.04 K/UL (ref 1.8–7.7)
NEUTROPHILS NFR BLD AUTO: 56.5 % (ref 53–65)
NONHDLC SERPL-MCNC: 52 MG/DL
NRBC # BLD AUTO: 0 X10E3/UL
NRBC, AUTO (.00): 0 %
PLATELET # BLD AUTO: 198 K/UL (ref 150–400)
POTASSIUM SERPL-SCNC: 4.5 MMOL/L (ref 3.5–5.1)
PROT SERPL-MCNC: 7.3 G/DL (ref 6.4–8.2)
RBC # BLD AUTO: 4.7 M/UL (ref 4.6–6.2)
SODIUM SERPL-SCNC: 139 MMOL/L (ref 136–145)
TRIGL SERPL-MCNC: 99 MG/DL (ref 35–150)
VLDLC SERPL-MCNC: 20 MG/DL
WBC # BLD AUTO: 8.92 K/UL (ref 4.5–11)

## 2024-02-15 NOTE — ASSESSMENT & PLAN NOTE
The current medical regimen is effective;  continue present plan and medications. Recommended patient to check home readings to monitor and see me for followup as scheduled or sooner as needed.    Encouraged frequent foot exams at home. Refer to podiatry. Encouraged diabetic footwear and no bare feet.   Diabetes treatment goals: (unless otherwise indicated due to risk factor stratification)  To achieve normal or near normal glucose levels.  Fasting glucose:   Before meals: 100-140  2 hours after meal:  Bedtime goal glucose > 100

## 2024-02-15 NOTE — ASSESSMENT & PLAN NOTE
Has known problem with tinea.   Discussed foot care and hygiene with patient. Encouraged proper diabetic foot wear.  Will refer to podiatry.     OTC tinactin spray recommended until sees podiatrist.

## 2024-02-15 NOTE — PROGRESS NOTES
Subjective:       Patient ID: Brendon Norris is a 63 y.o. male.    Chief Complaint: Diabetes (Check up)    62 y/o male presents for follow up for chronic conditions. See assessment and Plan notes.           Current Outpatient Medications:     albuterol (PROVENTIL/VENTOLIN HFA) 90 mcg/actuation inhaler, Inhale 2 puffs into the lungs 4 (four) times daily as needed for Wheezing., Disp: 18 g, Rfl: 5    amLODIPine (NORVASC) 5 MG tablet, Take 1 tablet by mouth twice daily, Disp: 180 tablet, Rfl: 0    aspirin-calcium carbonate 81 mg-300 mg calcium(777 mg) Tab, Take 81 mg by mouth once daily., Disp: , Rfl:     dapagliflozin (FARXIGA) 10 mg tablet, Take 10 mg by mouth once daily., Disp: , Rfl:     ezetimibe (ZETIA) 10 mg tablet, Take 10 mg by mouth., Disp: , Rfl:     LANTUS SOLOSTAR U-100 INSULIN glargine 100 units/mL SubQ pen, Sliding scale, Disp: 3 mL, Rfl: 9    metFORMIN (GLUCOPHAGE-XR) 500 MG ER 24hr tablet, Take 2 tablets by mouth twice daily, Disp: 180 tablet, Rfl: 0    metoprolol tartrate (LOPRESSOR) 25 MG tablet, Take 1 tablet (25 mg total) by mouth 2 (two) times daily., Disp: 180 tablet, Rfl: 1    nebivoloL (BYSTOLIC) 10 MG Tab, Take 10 mg by mouth., Disp: , Rfl:     olmesartan (BENICAR) 40 MG tablet, Take 1 tablet (40 mg total) by mouth once daily., Disp: 90 tablet, Rfl: 3    rosuvastatin (CRESTOR) 40 MG Tab, Take 1 tablet (40 mg total) by mouth nightly., Disp: 90 tablet, Rfl: 1    tiZANidine (ZANAFLEX) 4 MG tablet, tizanidine 4 mg tablet  TAKE 1 TABLET BY MOUTH EVERY 6 HOURS AS NEEDED, Disp: , Rfl:     BLOOD-GLUCOSE METER,CONTINUOUS MISC, 1 Device by NOT APPLICABLE route., Disp: , Rfl:     DAPTOmycin (CUBICIN) 350 mg injection, , Disp: , Rfl:     DAPTOmycin (CUBICIN) 350 mg injection, , Disp: , Rfl:     esomeprazole (NEXIUM) 40 MG capsule, Take 1 capsule by mouth once daily (Patient not taking: Reported on 2/14/2024), Disp: 90 capsule, Rfl: 0    UgenieE 2 READER Misc, 1 Device., Disp: , Rfl:      "FREESTYLE URVASHI 2 SENSOR Kit, 1 applicator by subcutaneous (via wearable injector) route every 14 (fourteen) days., Disp: 2 kit, Rfl: 5    insulin aspart U-100 (NOVOLOG FLEXPEN U-100 INSULIN) 100 unit/mL (3 mL) InPn pen, Inject 16 Units into the skin 3 (three) times daily. Inject 16 units at breakfast, 24 units at lunch, and 30 units at supper. Plus sliding scale of 2 units/50 > 150. Max total daily dose of 100 units., Disp: 43.2 mL, Rfl: 1    insulin detemir U-100, Levemir, (LEVEMIR FLEXPEN) 100 unit/mL (3 mL) InPn pen, INJECT 80 UNITS SUBCUTANEOUSLY AT BEDTIME . DO NOT EXCEED 90 PER 24 HOURS, Disp: , Rfl:     nitroGLYCERIN (NITROSTAT) 0.4 MG SL tablet, Place 1 tablet under the tongue every 5 (five) minutes as needed., Disp: , Rfl:     pen needle, diabetic 31 gauge x 1/4" Ndle, pen needle, diabetic 31 gauge x 1/4"  USE AS DIRECTED 4 TIMES DAILY, Disp: , Rfl:     pen needle, diabetic 31 gauge x 5/16" Ndle, Inject 1 Units into the skin 4 (four) times daily., Disp: 100 each, Rfl: 1    Review of patient's allergies indicates:   Allergen Reactions    Lisinopril      Other reaction(s): Cough       Past Medical History:   Diagnosis Date    Coronary artery disease     Diabetes mellitus, type 2     GERD (gastroesophageal reflux disease)     Hyperlipidemia     Hypertension        History reviewed. No pertinent surgical history.    Family History   Problem Relation Age of Onset    Learning disabilities Son        Social History     Tobacco Use    Smoking status: Former     Current packs/day: 1.00     Average packs/day: 1 pack/day for 1 year (1.0 ttl pk-yrs)     Types: Cigarettes    Smokeless tobacco: Never   Substance Use Topics    Alcohol use: Not Currently    Drug use: Not Currently       Review of Systems      Current Medications:   Medication List with Changes/Refills   Current Medications    ALBUTEROL (PROVENTIL/VENTOLIN HFA) 90 MCG/ACTUATION INHALER    Inhale 2 puffs into the lungs 4 (four) times daily as needed for " Wheezing.       Start Date: 9/26/2022 End Date: --    AMLODIPINE (NORVASC) 5 MG TABLET    Take 1 tablet by mouth twice daily       Start Date: 6/1/2023  End Date: --    ASPIRIN-CALCIUM CARBONATE 81 MG-300 MG CALCIUM(777 MG) TAB    Take 81 mg by mouth once daily.       Start Date: --        End Date: --    BLOOD-GLUCOSE METER,CONTINUOUS MISC    1 Device by NOT APPLICABLE route.       Start Date: 1/26/2023 End Date: --    DAPAGLIFLOZIN (FARXIGA) 10 MG TABLET    Take 10 mg by mouth once daily.       Start Date: --        End Date: --    DAPTOMYCIN (CUBICIN) 350 MG INJECTION           Start Date: 3/21/2023 End Date: --    DAPTOMYCIN (CUBICIN) 350 MG INJECTION           Start Date: 3/21/2023 End Date: --    ESOMEPRAZOLE (NEXIUM) 40 MG CAPSULE    Take 1 capsule by mouth once daily       Start Date: 6/1/2023  End Date: --    EZETIMIBE (ZETIA) 10 MG TABLET    Take 10 mg by mouth.       Start Date: 12/22/2022End Date: --    FREESTYLE URVASHI 2 READER MISC    1 Device.       Start Date: 1/30/2023 End Date: --    FREESTYLE URVASHI 2 SENSOR KIT    1 applicator by subcutaneous (via wearable injector) route every 14 (fourteen) days.       Start Date: 11/15/2023End Date: --    INSULIN ASPART U-100 (NOVOLOG FLEXPEN U-100 INSULIN) 100 UNIT/ML (3 ML) INPN PEN    Inject 16 Units into the skin 3 (three) times daily. Inject 16 units at breakfast, 24 units at lunch, and 30 units at supper. Plus sliding scale of 2 units/50 > 150. Max total daily dose of 100 units.       Start Date: 1/24/2023 End Date: 7/23/2023    INSULIN DETEMIR U-100, LEVEMIR, (LEVEMIR FLEXPEN) 100 UNIT/ML (3 ML) INPN PEN    INJECT 80 UNITS SUBCUTANEOUSLY AT BEDTIME . DO NOT EXCEED 90 PER 24 HOURS       Start Date: 11/27/2022End Date: --    LANTUS SOLOSTAR U-100 INSULIN GLARGINE 100 UNITS/ML SUBQ PEN    Sliding scale       Start Date: 11/15/2023End Date: --    METFORMIN (GLUCOPHAGE-XR) 500 MG ER 24HR TABLET    Take 2 tablets by mouth twice daily       Start Date: 2/12/2024  "End Date: --    METOPROLOL TARTRATE (LOPRESSOR) 25 MG TABLET    Take 1 tablet (25 mg total) by mouth 2 (two) times daily.       Start Date: 9/26/2022 End Date: --    NEBIVOLOL (BYSTOLIC) 10 MG TAB    Take 10 mg by mouth.       Start Date: 12/22/2022End Date: --    NITROGLYCERIN (NITROSTAT) 0.4 MG SL TABLET    Place 1 tablet under the tongue every 5 (five) minutes as needed.       Start Date: 12/22/2022End Date: --    OLMESARTAN (BENICAR) 40 MG TABLET    Take 1 tablet (40 mg total) by mouth once daily.       Start Date: 3/28/2023 End Date: 3/27/2024    PEN NEEDLE, DIABETIC 31 GAUGE X 1/4" NDLE    pen needle, diabetic 31 gauge x 1/4"   USE AS DIRECTED 4 TIMES DAILY       Start Date: --        End Date: --    PEN NEEDLE, DIABETIC 31 GAUGE X 5/16" NDLE    Inject 1 Units into the skin 4 (four) times daily.       Start Date: 9/26/2022 End Date: --    ROSUVASTATIN (CRESTOR) 40 MG TAB    Take 1 tablet (40 mg total) by mouth nightly.       Start Date: 9/26/2022 End Date: --    TIZANIDINE (ZANAFLEX) 4 MG TABLET    tizanidine 4 mg tablet   TAKE 1 TABLET BY MOUTH EVERY 6 HOURS AS NEEDED       Start Date: --        End Date: --            Objective:        Vitals:    02/14/24 1430   BP: (!) 167/78   BP Location: Right arm   Patient Position: Sitting   BP Method: Large (Automatic)   Pulse: 65   Resp: 18   Temp: 98.3 °F (36.8 °C)   TempSrc: Oral   SpO2: 97%   Weight: 97.1 kg (214 lb)   Height: 5' 7" (1.702 m)       Physical Exam  Constitutional:       Appearance: Normal appearance.   HENT:      Head: Normocephalic and atraumatic.      Right Ear: External ear normal.      Left Ear: External ear normal.      Mouth/Throat:      Mouth: Mucous membranes are moist.      Pharynx: Oropharynx is clear.   Eyes:      Extraocular Movements: Extraocular movements intact.      Pupils: Pupils are equal, round, and reactive to light.   Cardiovascular:      Rate and Rhythm: Normal rate and regular rhythm.      Heart sounds: Normal heart sounds. "   Pulmonary:      Effort: Pulmonary effort is normal.      Breath sounds: Normal breath sounds.   Abdominal:      Palpations: Abdomen is soft.   Musculoskeletal:      Cervical back: Neck supple.      Comments:     Protective Sensation (w/ 10 gram monofilament):  Right: Intact at site 1-10 in diagram  Left: Intact at site 1-10 in diagram    Visual Inspection:  Nails Intact - without Evidence of Foot Deformity- Bilateral     Pedal Pulses:   Right: Present  Left: Present        Skin:     General: Skin is warm and dry.      Capillary Refill: Capillary refill takes less than 2 seconds.   Neurological:      Mental Status: He is alert and oriented to person, place, and time.   Psychiatric:         Mood and Affect: Mood normal.         Behavior: Behavior normal.                Assessment:       1. Type 2 diabetes mellitus with diabetic polyneuropathy, with long-term current use of insulin    2. Hypertension, unspecified type    3. Hyperlipidemia, unspecified hyperlipidemia type    4. Toenail fungus    5. Diabetic mononeuropathy associated with type 2 diabetes mellitus        Plan:         Problem List Items Addressed This Visit          Neuro    Diabetic mononeuropathy associated with type 2 diabetes mellitus     The current medical regimen is effective;  continue present plan and medications. Recommended patient to check home readings to monitor and see me for followup as scheduled or sooner as needed.    Encouraged frequent foot exams at home. Refer to podiatry. Encouraged diabetic footwear and no bare feet.   Diabetes treatment goals: (unless otherwise indicated due to risk factor stratification)  To achieve normal or near normal glucose levels.  Fasting glucose:   Before meals: 100-140  2 hours after meal:  Bedtime goal glucose > 100             Derm    Toenail fungus     Has known problem with tinea.   Discussed foot care and hygiene with patient. Encouraged proper diabetic foot wear.  Will refer to podiatry.      OTC tinactin spray recommended until sees podiatrist.           Relevant Orders    Ambulatory referral/consult to Podiatry    LIPID PANEL    Comprehensive Metabolic Panel    Hemoglobin A1C    CBC Auto Differential       Cardiac/Vascular    Hyperlipidemia     Discussed need for low fat/low cholesterol diet, regular exercise, and weight control.   Cardiovascular risk and specific lipid/LDL goals reviewed.     Continue current regimen and lipid panel today         Relevant Orders    Lipid Panel    LIPID PANEL    Comprehensive Metabolic Panel    Hemoglobin A1C    CBC Auto Differential    Hypertension     Vitals:    02/14/24 1430   BP: (!) 167/78   Pulse: 65   Resp: 18   Temp: 98.3 °F (36.8 °C)      The current medical regimen is effective;  continue present plan and medications. Recommended patient to check home readings to monitor and see me for followup as scheduled or sooner as needed.   Discussed sodium restriction, maintaining ideal body weight and regular exercise program as physiologic means to continue to achieve blood pressure control in addition to medication compliance.     Patient was educated that both decongestant and anti-inflammatory medication may raise blood pressure.  The patient is not active on the digital hypertension program.          Relevant Orders    Comprehensive Metabolic Panel    LIPID PANEL    Comprehensive Metabolic Panel    Hemoglobin A1C    CBC Auto Differential       Endocrine    Type 2 diabetes mellitus with diabetic polyneuropathy, with long-term current use of insulin - Primary    Relevant Orders    CBC Auto Differential    Hemoglobin A1C    LIPID PANEL    Comprehensive Metabolic Panel    Hemoglobin A1C    CBC Auto Differential         Follow up in about 3 months (around 5/14/2024).    Sondra Simpson MD     Instructed patient that if symptoms fail to improve or worsen patient should seek immediate medical attention or report to the nearest emergency department. Patient expressed  verbal agreement and understanding to this plan of care.

## 2024-02-15 NOTE — ASSESSMENT & PLAN NOTE
Discussed need for low fat/low cholesterol diet, regular exercise, and weight control.   Cardiovascular risk and specific lipid/LDL goals reviewed.     Continue current regimen and lipid panel today

## 2024-02-15 NOTE — PATIENT INSTRUCTIONS
We understand that controlling diabetes can feel overwhelming at times. We are here to offer the tools and support to help you manage your diabetes and meet your health goals. Please reference the meal planning guide below.     Diabetic Meal Planning    About this topic  Healthy eating is an important part of keeping your diabetes in control. A balanced diet along with your diabetic drugs will help you control your blood sugar. The right portions of healthy foods may help keep your sugar within your goal range. It may also help to lower or maintain your weight, manage cholesterol, the amount of fat in your blood, and blood pressure.      Image(s)    What will the results be?  Healthy eating may help you lower your blood sugar and keep it in a safe range. Keeping your blood sugar in a safe range may lower your chances for long term problems from your diabetes. You may be less likely to have damage to your kidneys, heart, eyes, or nerves.    What changes to diet are needed?  Healthy eating means:  Eating a range of foods from all food groups.  Eating the right size portions. Read the nutrition facts on each food you eat.  Eating meals and snacks at the same time each day. Do not skip a meal or snack. Skipping meals may cause your blood sugar to go too low, especially if you are on drugs for your diabetes. Skipping meals can also cause you to eat too much at the next meal.  Talk to your diabetes educator about making a personal meal plan for you. They can also help you eat the foods you like by modifying them to be healthier.    Who should use this diet?  This diet is helpful to people with high blood sugar or diabetes.    What foods are good to eat?  It is important to make a healthy meal. Eat a variety of different foods in the right portion.  Fill half of your plate with non-starchy vegetables. Non-starchy vegetables include: Broccoli, green beans, carrots, greens (isha, kale, mustard, turnip), onions, tomatoes,  asparagus, beets, cauliflower, celery, and cucumber. Non-starchy vegetables are high in fiber and low in carbohydrates. These will help keep you full for longer without raising your blood sugar.  Fill 1/4 of your plate with carbohydrates. Try to choose whole grain options. Whole-grain products have more fiber which will make you feel full. Carbohydrates include: Bread, rice, pasta, and starchy vegetables. Starchy vegetables include beans, potatoes, acorn squash, butternut squash, corn, and peas.  Fill 1/4 of your plate with protein. Choose low-fat cuts of meat like boneless, skinless chicken breast; pork loin; 90% lean beef; lean and skinless turkey meat; and fresh fish (not fried) such as tuna, salmon, or mackerel.  Add a low-fat or non-fat dairy product like 8 ounces (240 mL) of 1% or skim milk or 6 ounces (180 mL) of low-fat yogurt. Eat the recommended serving. Milk and yogurt have carbohydrates which raise your blood sugar.  Add a small piece of fruit or 1/2 cup (120 grams) of frozen or canned fruit. Choose canned fruits in juice or water. Fruits include apples, bananas, peaches, pears, pineapple, plums, and oranges. Eat the recommended serving. Fruit has carbohydrates which can raise your blood sugar.  Include healthy fats in your meal like olive oil, canola oil, avocado, and nuts.  Other good foods to include in your diet are:  Foods high in fiber. Adding fiber to your meals may help control your blood sugar and cholesterol levels. It may also help with weight loss and prevent belly problems. If you are younger than 50, it is recommended to get 25 to 38 grams per day. If you are older than 50, it is recommended to get 21 to 30 grams per day. Good sources of fiber include:  Nuts and seeds  Beans, peas, and other legumes  Whole-grain products  Fruits  Vegetables  Smart snacks in the right portion. Do not go too long in between meals. Ask your dietitian when you should have a snack in between your meals. Snack on  things like:  Nuts  Vegetables and low-fat dressing  Light popcorn  Low-fat cheese and crackers  1/2 sandwich    What foods should be limited or avoided?  You may need to limit the amount of some foods you eat and how often you eat them. Foods that should be limited include:  High fat or processed foods like:  Napoles  Sausage  Hot dogs  Whole-fat dairy products  Potato chips  Foods high in sodium like:  Canned soups  Soy sauce and some salad dressings  Cured meats  Salted snack foods like pretzels  Olives  Fats and oils like:  Margarine  Salad dressing  Gravy  Lard or shortening  Foods high in sugar like:  Candy  Cookies  Cake  Ice cream  Table sugar  Soda  Juice drinks  Starches that are not whole grain like:  White rice  French fries  White pasta  White bread  Sugary cereals  Baked goods, pastries, croissants  Beer, wine, and mixed drinks (alcohol). Drink alcohol in moderation (1 drink per day or less for adult women and 2 drinks per day or less for adult men). Drinking alcohol can cause fluctuations in your blood sugar and interfere with how your diabetic drugs work. Talk to your doctor about how you can safely include alcohol into your diet.    What can be done to prevent this health problem?  Some people have a higher chance of developing diabetes than others. This is a life-long problem. You can still lead a normal life. Diabetes can be managed through diet, exercise, and drugs. It is important to have support from your family and friends to help you with your goals.    When do I need to call the doctor?  Blood sugar level is above 240 mg/dL for more than a day  Blood sugar level drops to less than 40 and does not respond to 15 grams of simple carbohydrate, like 4 glucose tablets or 1/2 cup (120 mL) of fruit juice  Trouble breathing  Very sleepy and trouble concentrating  Feeling very thirsty  Needing to urinate (pee) more than normal  Throw up more than once or have many loose stools  You are so sick you  cannot eat or drink  Fever over 101°F (38°C)  Questions about your diet plan  You are not feeling better in 2 to 3 days or you are feeling worse    Helpful tips  Plan ahead. Plan your meals and grocery list before going to the store. This will help you to choose foods that are good for you.  Pack a lunch. Take healthy meals and snacks with you to work.  Keep a food journal to help keep you on track. Take note of foods that keep your blood sugar in goal range. Also note foods and meals that raise or lower your blood sugar. There are apps for your phone that can help with this.  Visit a restaurant's website before eating out. You can see the menu options and nutritional facts. This way, you can see which items best fit into your diet plan. Call ahead if you have questions.    Disclaimer.This generalized information is a limited summary of diagnosis, treatment, and/or medication information. It is not meant to be comprehensive and should be used as a tool to help the user understand and/or assess potential diagnostic and treatment options. It does NOT include all information about conditions, treatments, medications, side effects, or risks that may apply to a specific patient. It is not intended to be medical advice or a substitute for the medical advice, diagnosis, or treatment of a health care provider based on the health care provider's examination and assessment of a patient's specific and unique circumstances. Patients must speak with a health care provider for complete information about their health, medical questions, and treatment options, including any risks or benefits regarding use of medications. This information does not endorse any treatments or medications as safe, effective, or approved for treating a specific patient. [employer] and its affiliates disclaim any warranty or liability relating to this information or the use thereof. The use of this information is governed by the Terms of Use,  available at Terms of Use. ©2022 [employer] and its affiliates and/or licensors. All rights reserved.e

## 2024-02-15 NOTE — ASSESSMENT & PLAN NOTE
Vitals:    02/14/24 1430   BP: (!) 167/78   Pulse: 65   Resp: 18   Temp: 98.3 °F (36.8 °C)      The current medical regimen is effective;  continue present plan and medications. Recommended patient to check home readings to monitor and see me for followup as scheduled or sooner as needed.   Discussed sodium restriction, maintaining ideal body weight and regular exercise program as physiologic means to continue to achieve blood pressure control in addition to medication compliance.     Patient was educated that both decongestant and anti-inflammatory medication may raise blood pressure.  The patient is not active on the digital hypertension program.

## 2024-02-16 LAB
EST. AVERAGE GLUCOSE BLD GHB EST-MCNC: 157 MG/DL
HBA1C MFR BLD HPLC: 7.1 % (ref 4.5–6.6)

## 2024-03-27 DIAGNOSIS — Z79.4 TYPE 2 DIABETES MELLITUS WITHOUT COMPLICATION, WITH LONG-TERM CURRENT USE OF INSULIN: ICD-10-CM

## 2024-03-27 DIAGNOSIS — E11.9 TYPE 2 DIABETES MELLITUS WITHOUT COMPLICATION, WITH LONG-TERM CURRENT USE OF INSULIN: ICD-10-CM

## 2024-03-28 ENCOUNTER — APPOINTMENT (RX ONLY)
Dept: URBAN - METROPOLITAN AREA CLINIC 304 | Facility: CLINIC | Age: 64
Setting detail: DERMATOLOGY
End: 2024-03-28

## 2024-03-28 DIAGNOSIS — L82.1 OTHER SEBORRHEIC KERATOSIS: ICD-10-CM

## 2024-03-28 DIAGNOSIS — L57.8 OTHER SKIN CHANGES DUE TO CHRONIC EXPOSURE TO NONIONIZING RADIATION: ICD-10-CM

## 2024-03-28 DIAGNOSIS — D22 MELANOCYTIC NEVI: ICD-10-CM

## 2024-03-28 PROBLEM — D22.5 MELANOCYTIC NEVI OF TRUNK: Status: ACTIVE | Noted: 2024-03-28

## 2024-03-28 PROCEDURE — ? SUNSCREEN TREATMENT REGIMEN

## 2024-03-28 PROCEDURE — 99203 OFFICE O/P NEW LOW 30 MIN: CPT

## 2024-03-28 PROCEDURE — ? FULL BODY SKIN EXAM

## 2024-03-28 PROCEDURE — ? COUNSELING

## 2024-03-28 ASSESSMENT — LOCATION DETAILED DESCRIPTION DERM
LOCATION DETAILED: RIGHT MID-UPPER BACK
LOCATION DETAILED: LEFT SUPERIOR MEDIAL FOREHEAD
LOCATION DETAILED: LEFT SUPERIOR MEDIAL UPPER BACK

## 2024-03-28 ASSESSMENT — LOCATION ZONE DERM
LOCATION ZONE: TRUNK
LOCATION ZONE: FACE

## 2024-03-28 ASSESSMENT — LOCATION SIMPLE DESCRIPTION DERM
LOCATION SIMPLE: LEFT UPPER BACK
LOCATION SIMPLE: LEFT FOREHEAD
LOCATION SIMPLE: RIGHT UPPER BACK

## 2024-03-28 NOTE — HPI: EVALUATION OF SKIN LESION(S)
Hpi Title: Evaluation of Skin Lesions
Additional History: Pt is present for FBSE, pt states his wife saw a new mole on back.

## 2024-04-03 RX ORDER — METFORMIN HYDROCHLORIDE 500 MG/1
1000 TABLET, EXTENDED RELEASE ORAL 2 TIMES DAILY
Qty: 180 TABLET | Refills: 0 | Status: SHIPPED | OUTPATIENT
Start: 2024-04-03

## 2024-04-09 DIAGNOSIS — Z71.89 COMPLEX CARE COORDINATION: ICD-10-CM

## 2024-04-20 DIAGNOSIS — E11.42 TYPE 2 DIABETES MELLITUS WITH DIABETIC POLYNEUROPATHY, WITH LONG-TERM CURRENT USE OF INSULIN: ICD-10-CM

## 2024-04-20 DIAGNOSIS — Z79.4 TYPE 2 DIABETES MELLITUS WITH DIABETIC POLYNEUROPATHY, WITH LONG-TERM CURRENT USE OF INSULIN: ICD-10-CM

## 2024-04-20 RX ORDER — FLASH GLUCOSE SENSOR
KIT MISCELLANEOUS
Qty: 1 KIT | Refills: 11 | Status: SHIPPED | OUTPATIENT
Start: 2024-04-20 | End: 2024-05-04

## 2024-05-08 ENCOUNTER — OFFICE VISIT (OUTPATIENT)
Dept: FAMILY MEDICINE | Facility: CLINIC | Age: 64
End: 2024-05-08
Payer: MEDICARE

## 2024-05-08 VITALS
DIASTOLIC BLOOD PRESSURE: 76 MMHG | SYSTOLIC BLOOD PRESSURE: 138 MMHG | WEIGHT: 218.38 LBS | HEIGHT: 67 IN | HEART RATE: 69 BPM | TEMPERATURE: 98 F | RESPIRATION RATE: 18 BRPM | BODY MASS INDEX: 34.28 KG/M2 | OXYGEN SATURATION: 96 %

## 2024-05-08 DIAGNOSIS — Z79.4 TYPE 2 DIABETES MELLITUS WITH HYPERGLYCEMIA, WITH LONG-TERM CURRENT USE OF INSULIN: Primary | ICD-10-CM

## 2024-05-08 DIAGNOSIS — E11.65 TYPE 2 DIABETES MELLITUS WITH HYPERGLYCEMIA, WITH LONG-TERM CURRENT USE OF INSULIN: Primary | ICD-10-CM

## 2024-05-08 DIAGNOSIS — R60.0 LOWER EXTREMITY EDEMA: ICD-10-CM

## 2024-05-08 DIAGNOSIS — I10 HYPERTENSION, UNSPECIFIED TYPE: ICD-10-CM

## 2024-05-08 DIAGNOSIS — E11.42 TYPE 2 DIABETES MELLITUS WITH DIABETIC POLYNEUROPATHY, WITH LONG-TERM CURRENT USE OF INSULIN: ICD-10-CM

## 2024-05-08 DIAGNOSIS — B35.4 TINEA CORPORIS: ICD-10-CM

## 2024-05-08 DIAGNOSIS — L60.2 OVERGROWN TOENAILS: ICD-10-CM

## 2024-05-08 DIAGNOSIS — Z79.4 TYPE 2 DIABETES MELLITUS WITH DIABETIC POLYNEUROPATHY, WITH LONG-TERM CURRENT USE OF INSULIN: ICD-10-CM

## 2024-05-08 LAB
CREAT UR-MCNC: 72 MG/DL (ref 39–259)
EST. AVERAGE GLUCOSE BLD GHB EST-MCNC: 160 MG/DL
HBA1C MFR BLD HPLC: 7.2 % (ref 4.5–6.6)
MICROALBUMIN UR-MCNC: 13.2 MG/DL (ref 0–2.8)
MICROALBUMIN/CREAT RATIO PNL UR: 183.3 MG/G (ref 0–30)

## 2024-05-08 PROCEDURE — 3066F NEPHROPATHY DOC TX: CPT | Mod: ,,, | Performed by: SPECIALIST

## 2024-05-08 PROCEDURE — 3075F SYST BP GE 130 - 139MM HG: CPT | Mod: ,,, | Performed by: SPECIALIST

## 2024-05-08 PROCEDURE — 82570 ASSAY OF URINE CREATININE: CPT | Mod: ,,, | Performed by: CLINICAL MEDICAL LABORATORY

## 2024-05-08 PROCEDURE — 82043 UR ALBUMIN QUANTITATIVE: CPT | Mod: ,,, | Performed by: CLINICAL MEDICAL LABORATORY

## 2024-05-08 PROCEDURE — 3008F BODY MASS INDEX DOCD: CPT | Mod: ,,, | Performed by: SPECIALIST

## 2024-05-08 PROCEDURE — 4010F ACE/ARB THERAPY RXD/TAKEN: CPT | Mod: ,,, | Performed by: SPECIALIST

## 2024-05-08 PROCEDURE — 83036 HEMOGLOBIN GLYCOSYLATED A1C: CPT | Mod: ,,, | Performed by: CLINICAL MEDICAL LABORATORY

## 2024-05-08 PROCEDURE — 3060F POS MICROALBUMINURIA REV: CPT | Mod: ,,, | Performed by: SPECIALIST

## 2024-05-08 PROCEDURE — 3051F HG A1C>EQUAL 7.0%<8.0%: CPT | Mod: ,,, | Performed by: SPECIALIST

## 2024-05-08 PROCEDURE — 99213 OFFICE O/P EST LOW 20 MIN: CPT | Mod: GC,,, | Performed by: SPECIALIST

## 2024-05-08 PROCEDURE — 3078F DIAST BP <80 MM HG: CPT | Mod: ,,, | Performed by: SPECIALIST

## 2024-05-08 RX ORDER — AMLODIPINE BESYLATE 10 MG/1
10 TABLET ORAL DAILY
Qty: 90 TABLET | Refills: 3 | Status: SHIPPED | OUTPATIENT
Start: 2024-05-08 | End: 2025-05-08

## 2024-05-08 RX ORDER — FLUCONAZOLE 150 MG/1
150 TABLET ORAL
Qty: 4 TABLET | Refills: 0 | Status: SHIPPED | OUTPATIENT
Start: 2024-05-08 | End: 2024-05-30

## 2024-05-09 NOTE — PROGRESS NOTES
Subjective     Patient ID: Brendon Norris is a 63 y.o. male.    Chief Complaint: Follow-up (Patient came in today for a 3 month follow up on Diabetes.)    Patient is a 63 year old  male who presents for follow-up regarding diabetes. The patient states that he has had a rash on his left and right lower extremities. The patient states that he has been using Bag Balm on bilateral lower extremities. Patient was recommended to use OTC tinactin spray at his last appointment. The patient states that there is urticaria of the rash in hot weather. 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 The patient's glucose levels have ranged in the 130-160s.     The patient states that he has been out of his Amlodipine medication for roughly a week. His BP today is 138/76. He is currently doing ambulatory BP monitoring with Dr. Rivera ([facility]). His values have ranged from 168 - 130s systolic on electronic logs.         Review of Systems   Constitutional:  Negative for chills, fatigue and fever.   HENT:  Negative for nasal congestion, sinus pressure/congestion and sore throat.    Respiratory:  Negative for cough, chest tightness, shortness of breath and wheezing.    Cardiovascular:  Positive for leg swelling. Negative for chest pain.   Gastrointestinal:  Negative for abdominal pain, diarrhea, nausea and vomiting.   Genitourinary:  Negative for dysuria.   Integumentary:  Positive for color change, rash and mole/lesion.   Neurological:  Negative for dizziness, weakness and headaches.        Objective   Vitals:    05/08/24 1423   BP: 138/76   Pulse: 69   Resp: 18   Temp: 98.2 °F (36.8 °C)       Physical Exam  Constitutional:       General: He is not in acute distress.     Appearance: Normal appearance.   HENT:      Head: Normocephalic and atraumatic.      Right Ear:  External ear normal.      Left Ear: External ear normal.      Nose: Nose normal.      Mouth/Throat:      Mouth: Mucous membranes are moist.   Eyes:      Extraocular Movements: Extraocular movements intact.      Conjunctiva/sclera: Conjunctivae normal.      Pupils: Pupils are equal, round, and reactive to light.   Cardiovascular:      Rate and Rhythm: Normal rate and regular rhythm.      Pulses: Normal pulses.      Heart sounds: Normal heart sounds.   Pulmonary:      Effort: Pulmonary effort is normal.      Breath sounds: Normal breath sounds.   Abdominal:      General: Bowel sounds are normal.      Palpations: Abdomen is soft.   Musculoskeletal:         General: Normal range of motion.      Right lower leg: Edema present.      Left lower leg: Edema present.      Comments: Please view pictures. Lesions on bilateral lower extremities     Rutledge-sized lesion on RT LE and rash on dorsal aspect of LT LE. Rash is dry to touch.    Skin:     General: Skin is warm.   Neurological:      General: No focal deficit present.      Mental Status: He is alert and oriented to person, place, and time.   Psychiatric:         Mood and Affect: Mood normal.         Behavior: Behavior normal.         Thought Content: Thought content normal.         Judgment: Judgment normal.        Assessment and Plan     1. Type 2 diabetes mellitus with hyperglycemia, with long-term current use of insulin  -     Hemoglobin A1C; Future; Expected date: 05/08/2024  -     Microalbumin/Creatinine Ratio, Urine    2. Tinea corporis  Assessment & Plan:  Monitor - Monitor rash progression and urticaria   Evaluate - Physical exam demonstrates rash suspicious for tinea corporis   Assessment - Tinea corporis on bilateral lower extremities   Treat - Diflucan 150mg q7 days for up to 4 weeks. Follow-up in 2 weeks.       Orders:  -     fluconazole (DIFLUCAN) 150 MG Tab; Take 1 tablet (150 mg total) by mouth every 7 days. for 4 doses  Dispense: 4 tablet; Refill: 0    3.  Overgrown toenails  Assessment & Plan:  - Podiatry referral sent for cutting overgrown and thickened toenails      Orders:  -     Ambulatory referral/consult to Podiatry; Future; Expected date: 05/15/2024    4. Hypertension, unspecified type  Assessment & Plan:  Monitor - Monitor BP. Continue to log values electronically  Evaluate - BP today is 138/76. Logs have documented systolic as high as 160s.   Assessment - Uncontrolled HTN  Treat - Refill Amlodipine 10mg QD. Patient to continue to log BP. Follow-up for BP in 2 weeks.       Orders:  -     amLODIPine (NORVASC) 10 MG tablet; Take 1 tablet (10 mg total) by mouth once daily.  Dispense: 90 tablet; Refill: 3    5. Lower extremity edema  Assessment & Plan:  Monitor - Monitor for worsening lower extremity edema    Evaluate - Physical exam demonstrates skin changes on bilateral lower extremities suspicious for venous insufficiency. Lower extremity edema with swollen toes.     Assessment - ISAAC in 2/2023 were 1.17 on the right and 1.24 on the left. No arterial occlusions. Lower extremity skin changes and LE edema likely to be secondary to venous insufficiency     Treat - Referral to Dr. Ghosh (vein center). 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 Stressed importance of elevating legs at the end of the day. Patient encouraged to use a lotion with zinc and vitamin E. Patient told to wash legs and feet BID to prevent LE wounds.       Orders:  -     Ambulatory referral/consult to [facility]; Future; Expected date: 05/15/2024    6. Type 2 diabetes mellitus with diabetic polyneuropathy, with long-term current use of insulin  Assessment & Plan:  Monitor: Continue to monitor glucose levels. Keep log to be reviewed.  Evaluate: Reviewed glucose logs   Assessment: DMII with DM polyneuropathy with long term use of insulin   Treat: HBA1c pending. Microalbumin/Creatinine ratio, urine pending. Podiatry referral  pending.                    Follow up in about 2 weeks (around 5/22/2024).

## 2024-05-09 NOTE — ASSESSMENT & PLAN NOTE
- Podiatry referral sent for cutting overgrown and thickened toenails    
Monitor - Monitor BP. Continue to log values electronically  Evaluate - BP today is 138/76. Logs have documented systolic as high as 160s.   Assessment - Uncontrolled HTN  Treat - Refill Amlodipine 10mg QD. Patient to continue to log BP. Follow-up for BP in 2 weeks.     
Monitor - Monitor for worsening lower extremity edema    Evaluate - Physical exam demonstrates skin changes on bilateral lower extremities suspicious for venous insufficiency. Lower extremity edema with swollen toes.     Assessment - ISAAC in 2/2023 were 1.17 on the right and 1.24 on the left. No arterial occlusions. Lower extremity skin changes and LE edema likely to be secondary to venous insufficiency     Treat - Referral to Dr. Ghosh (vein center). 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 Stressed importance of elevating legs at the end of the day. Patient encouraged to use a lotion with zinc and vitamin E. Patient told to wash legs and feet BID to prevent LE wounds.     
Monitor - Monitor rash progression and urticaria   Evaluate - Physical exam demonstrates rash suspicious for tinea corporis   Assessment - Tinea corporis on bilateral lower extremities   Treat - Diflucan 150mg q7 days for up to 4 weeks. Follow-up in 2 weeks.     
Monitor: Continue to monitor glucose levels. Keep log to be reviewed.  Evaluate: Reviewed glucose logs   Assessment: DMII with DM polyneuropathy with long term use of insulin   Treat: HBA1c pending. Microalbumin/Creatinine ratio, urine pending. Podiatry referral pending.   
English

## 2024-05-13 NOTE — PROGRESS NOTES
Microalbumin/Creatinine ratio is 183.3 (not indicative of macroalbuminuria (>300)). Continue to monitor yearly.

## 2024-05-16 ENCOUNTER — INITIAL CONSULT (OUTPATIENT)
Dept: VASCULAR SURGERY | Facility: CLINIC | Age: 64
End: 2024-05-16
Payer: MEDICARE

## 2024-05-16 VITALS
HEART RATE: 72 BPM | HEIGHT: 67 IN | BODY MASS INDEX: 33.9 KG/M2 | DIASTOLIC BLOOD PRESSURE: 77 MMHG | SYSTOLIC BLOOD PRESSURE: 159 MMHG | RESPIRATION RATE: 16 BRPM | WEIGHT: 216 LBS

## 2024-05-16 DIAGNOSIS — M79.605 PAIN IN BOTH LOWER EXTREMITIES: Primary | ICD-10-CM

## 2024-05-16 DIAGNOSIS — M79.604 PAIN IN BOTH LOWER EXTREMITIES: Primary | ICD-10-CM

## 2024-05-16 DIAGNOSIS — L81.9 HYPERPIGMENTATION OF SKIN: ICD-10-CM

## 2024-05-16 DIAGNOSIS — R60.0 LOWER EXTREMITY EDEMA: ICD-10-CM

## 2024-05-16 PROCEDURE — 99215 OFFICE O/P EST HI 40 MIN: CPT | Mod: PBBFAC | Performed by: FAMILY MEDICINE

## 2024-05-16 PROCEDURE — 99203 OFFICE O/P NEW LOW 30 MIN: CPT | Mod: S$PBB,,, | Performed by: FAMILY MEDICINE

## 2024-05-16 NOTE — PROGRESS NOTES
VEIN CENTER CLINIC NOTE    Patient ID: Brendon Norris is a 63 y.o. male.    I. HISTORY     Chief Complaint:   Chief Complaint   Patient presents with    Leg Swelling     NP; REF BY YESI OAKES, LEG SWELLING        HPI: Brendon Norris is a 63 y.o. male who is referred here today by [facility] for evaluation of bilateral lower extremity swelling, hyperpigmentation, varicose veins and discomfort..  Symptoms are persistent and began greater than 5 years ago.  Location is bilateral lower extremities below the knees. Symptoms are worse at the end of the day.  History of venous interventions includes none.  Unknown family history of venous disease.  Patient denies history of DVT or cellulitis.  The patient also sees Dr. Ta, [facility], and has undergone cardiac stenting about 2 years ago in his currently on 81 mg aspirin.    The patient underwent arterial duplex with ABIs on 02/21/2023 which showed a right ISAAC 1.17 and 1.24 on the left.  No arterial occlusions demonstrated.  Mild-to-moderate arterial velocity elevations noted bilaterally.  Moderately prominent lymph node within the left upper thigh.    Venous Disease Medical Necessity Documentation Initial Visit Date:  05/16/2024 Return Check Date:    Have you ever had a rupture or bleed from a varicose vein in your leg(s)?              [] Yes  [x] No   [] Yes   [] No   Have you ever been diagnosed with phlebitis, cellulitis, or inflammation in the area of the varicose veins of  your leg(s)?  [] Yes  [x] No    [] Yes   [] No   Do you have darkened or inflamed skin on your legs?   [x] Yes   [] No   [] Yes   [] No   Do you have leg swelling?     [x] Yes   [] No   [] Yes   [] No   Do you have leg pain?   [x] Yes   [] No   [] Yes   [] No   If yes, describe the type of pain?    []   Stabbing []  Radiating [x]  Aching   []  Tightness []  Throbbing               []  Burning []  Cramping              Do you have leg discomfort?   [x] Yes   [] No   [] Yes   [] No    If yes, describe the type of discomfort?    []  Heaviness []  Fullness   [x]  Restlessness [x] Tired/Fatigued [x] Itching              Have you ever worn compression hose?   [x] Yes   [] No   [] Yes   [] No   If yes, how long?  1 MONTH         Do you elevate your legs while sitting?   [x] Yes   [] No   [] Yes   [] No   Does venous disease (varicose veins, ulcers, skin changes, leg pain/swelling) interfere with your daily life?  If yes, check activities you are limited or unable to do.    []  Shower  []   Walk  []  Exercise  [] Play with children/grandchildren  []  Shop [] Work [] Stand for any period of time [x] Sleep                               [] Sitting for an extended period of time.           [x] Yes   [] No   [] Yes   [] No   Do you exercise/have you tried to exercise (i.e.  Walk our participate in a regular exercise routine)?  [] Yes  [x] No   [] Yes   [] No   BMI   33.83           Past Medical History:   Diagnosis Date    Coronary artery disease     Diabetes mellitus, type 2     GERD (gastroesophageal reflux disease)     Hyperlipidemia     Hypertension         Past Surgical History:   Procedure Laterality Date    LEFT ARM      LEFT TOTAL KNEE       RIGHT SHOULDER Left        Social History     Tobacco Use   Smoking Status Former    Current packs/day: 1.00    Average packs/day: 1 pack/day for 1 year (1.0 ttl pk-yrs)    Types: Cigarettes   Smokeless Tobacco Never         Current Outpatient Medications:     albuterol (PROVENTIL/VENTOLIN HFA) 90 mcg/actuation inhaler, Inhale 2 puffs into the lungs 4 (four) times daily as needed for Wheezing., Disp: 18 g, Rfl: 5    amLODIPine (NORVASC) 10 MG tablet, Take 1 tablet (10 mg total) by mouth once daily., Disp: 90 tablet, Rfl: 3    aspirin-calcium carbonate 81 mg-300 mg calcium(777 mg) Tab, Take 81 mg by mouth once daily., Disp: , Rfl:     BLOOD-GLUCOSE METER,CONTINUOUS MISC, 1 Device by NOT APPLICABLE route., Disp: , Rfl:     dapagliflozin (FARXIGA) 10 mg tablet,  "Take 10 mg by mouth once daily., Disp: , Rfl:     ezetimibe (ZETIA) 10 mg tablet, Take 10 mg by mouth., Disp: , Rfl:     fluconazole (DIFLUCAN) 150 MG Tab, Take 1 tablet (150 mg total) by mouth every 7 days. for 4 doses, Disp: 4 tablet, Rfl: 0    FREESTYLE URVASHI 2 READER Misc, 1 Device., Disp: , Rfl:     LANTUS SOLOSTAR U-100 INSULIN glargine 100 units/mL SubQ pen, Sliding scale, Disp: 3 mL, Rfl: 9    metFORMIN (GLUCOPHAGE-XR) 500 MG ER 24hr tablet, Take 2 tablets by mouth twice daily, Disp: 180 tablet, Rfl: 0    metoprolol tartrate (LOPRESSOR) 25 MG tablet, Take 1 tablet (25 mg total) by mouth 2 (two) times daily., Disp: 180 tablet, Rfl: 1    nebivoloL (BYSTOLIC) 10 MG Tab, Take 10 mg by mouth., Disp: , Rfl:     nitroGLYCERIN (NITROSTAT) 0.4 MG SL tablet, Place 1 tablet under the tongue every 5 (five) minutes as needed., Disp: , Rfl:     pen needle, diabetic 31 gauge x 5/16" Ndle, Inject 1 Units into the skin 4 (four) times daily., Disp: 100 each, Rfl: 1    rosuvastatin (CRESTOR) 40 MG Tab, Take 1 tablet (40 mg total) by mouth nightly., Disp: 90 tablet, Rfl: 1    tiZANidine (ZANAFLEX) 4 MG tablet, tizanidine 4 mg tablet  TAKE 1 TABLET BY MOUTH EVERY 6 HOURS AS NEEDED, Disp: , Rfl:     amLODIPine (NORVASC) 5 MG tablet, Take 1 tablet by mouth twice daily, Disp: 180 tablet, Rfl: 0    esomeprazole (NEXIUM) 40 MG capsule, Take 1 capsule by mouth once daily, Disp: 90 capsule, Rfl: 0    insulin aspart U-100 (NOVOLOG FLEXPEN U-100 INSULIN) 100 unit/mL (3 mL) InPn pen, Inject 16 Units into the skin 3 (three) times daily. Inject 16 units at breakfast, 24 units at lunch, and 30 units at supper. Plus sliding scale of 2 units/50 > 150. Max total daily dose of 100 units., Disp: 43.2 mL, Rfl: 1    insulin detemir U-100, Levemir, (LEVEMIR FLEXPEN) 100 unit/mL (3 mL) InPn pen, INJECT 80 UNITS SUBCUTANEOUSLY AT BEDTIME . DO NOT EXCEED 90 PER 24 HOURS, Disp: , Rfl:     olmesartan (BENICAR) 40 MG tablet, Take 1 tablet (40 mg total) by " "mouth once daily., Disp: 90 tablet, Rfl: 3    pen needle, diabetic 31 gauge x 1/4" Ndle, pen needle, diabetic 31 gauge x 1/4"  USE AS DIRECTED 4 TIMES DAILY, Disp: , Rfl:     Review of Systems   Constitutional:  Negative for activity change, chills, diaphoresis, fatigue and fever.   Respiratory:  Negative for cough and shortness of breath.    Cardiovascular:  Positive for leg swelling. Negative for chest pain and claudication.        Hyperpigmentation LE   Gastrointestinal:  Negative for nausea and vomiting.   Musculoskeletal:  Positive for leg pain. Negative for joint swelling.   Integumentary:  Negative for rash and wound.   Neurological:  Negative for weakness and numbness.          II. PHYSICAL EXAM     Physical Exam  Constitutional:       General: He is awake. He is not in acute distress.     Appearance: Normal appearance. He is overweight. He is not ill-appearing or toxic-appearing.   HENT:      Head: Normocephalic and atraumatic.   Eyes:      Extraocular Movements: Extraocular movements intact.      Conjunctiva/sclera: Conjunctivae normal.      Pupils: Pupils are equal, round, and reactive to light.   Neck:      Vascular: No carotid bruit or JVD.   Cardiovascular:      Rate and Rhythm: Normal rate and regular rhythm.      Pulses:           Dorsalis pedis pulses are detected w/ Doppler on the right side and detected w/ Doppler on the left side.        Posterior tibial pulses are detected w/ Doppler on the right side and detected w/ Doppler on the left side.      Heart sounds: No murmur heard.  Pulmonary:      Effort: Pulmonary effort is normal. No respiratory distress.      Breath sounds: No stridor. No wheezing, rhonchi or rales.   Musculoskeletal:         General: No swelling, tenderness or deformity.      Right lower le+ Edema present.      Left lower le+ Edema present.      Comments: Edema of the bilateral lower extremities without evidence of open ulceration or cellulitis.   Feet:      Comments: " Triphasic hand-held dopplerable pulses of the bilateral dorsalis pedis and posterior tibial arteries.  Skin:     General: Skin is warm.      Capillary Refill: Capillary refill takes less than 2 seconds.      Coloration: Skin is not ashen.      Findings: No bruising, erythema, lesion, rash or wound.   Neurological:      Mental Status: He is alert and oriented to person, place, and time.      Motor: No weakness.   Psychiatric:         Speech: Speech normal.         Behavior: Behavior normal. Behavior is cooperative.         Reticular/Spider veins noted:  RLE: anterior calf, medial calf, and ankle  LLE: anterior calf, medial calf, and ankle    Varicose veins noted:  RLE: medial calf  LLE:  medial calf    CEAP Classification                       Venous Clinical Severity Score     III. ASSESSMENT & PLAN (MEDICAL DECISION MAKING)     1. Pain in both lower extremities    2. Lower extremity edema    3. Hyperpigmentation of skin        Assessment/Diagnosis and Plan:  Patient has complaints, symptoms and physical exam findings of chronic venous disease.  Therefore, I will order a bilateral complete venous reflux study and see the patient back with results.    Orders Placed This Encounter    US Venous Reflux Study Bilateral          Samm Ghosh DO

## 2024-05-21 ENCOUNTER — TELEPHONE (OUTPATIENT)
Dept: FAMILY MEDICINE | Facility: CLINIC | Age: 64
End: 2024-05-21
Payer: MEDICARE

## 2024-05-21 NOTE — TELEPHONE ENCOUNTER
----- Message from Willard Johansen sent at 5/16/2024  3:47 PM CDT -----  Regarding: Med refill  Freestyle Sung sensor    Says it takes 2 boxes to last a month, so he asked if we would send in enough to last that long, as he hasn't had enough to last this past month.     [facility] in [place]    Thanks!

## 2024-05-22 ENCOUNTER — OFFICE VISIT (OUTPATIENT)
Dept: VASCULAR SURGERY | Facility: CLINIC | Age: 64
End: 2024-05-22
Attending: FAMILY MEDICINE
Payer: MEDICARE

## 2024-05-22 ENCOUNTER — OFFICE VISIT (OUTPATIENT)
Dept: FAMILY MEDICINE | Facility: CLINIC | Age: 64
End: 2024-05-22
Payer: MEDICARE

## 2024-05-22 ENCOUNTER — HOSPITAL ENCOUNTER (OUTPATIENT)
Dept: RADIOLOGY | Facility: HOSPITAL | Age: 64
Discharge: HOME | End: 2024-05-22
Attending: FAMILY MEDICINE
Payer: MEDICARE

## 2024-05-22 VITALS
HEART RATE: 72 BPM | RESPIRATION RATE: 17 BRPM | TEMPERATURE: 98 F | BODY MASS INDEX: 34.21 KG/M2 | WEIGHT: 218 LBS | OXYGEN SATURATION: 94 % | HEIGHT: 67 IN | DIASTOLIC BLOOD PRESSURE: 62 MMHG | SYSTOLIC BLOOD PRESSURE: 118 MMHG

## 2024-05-22 DIAGNOSIS — E11.65 TYPE 2 DIABETES MELLITUS WITH HYPERGLYCEMIA, WITH LONG-TERM CURRENT USE OF INSULIN: ICD-10-CM

## 2024-05-22 DIAGNOSIS — M79.605 PAIN IN BOTH LOWER EXTREMITIES: ICD-10-CM

## 2024-05-22 DIAGNOSIS — I87.2 VENOUS INSUFFICIENCY: Primary | ICD-10-CM

## 2024-05-22 DIAGNOSIS — M79.604 PAIN IN BOTH LOWER EXTREMITIES: ICD-10-CM

## 2024-05-22 DIAGNOSIS — L81.9 HYPERPIGMENTATION OF SKIN: ICD-10-CM

## 2024-05-22 DIAGNOSIS — Z79.4 TYPE 2 DIABETES MELLITUS WITH HYPERGLYCEMIA, WITH LONG-TERM CURRENT USE OF INSULIN: ICD-10-CM

## 2024-05-22 DIAGNOSIS — H61.21 HEARING LOSS OF RIGHT EAR DUE TO CERUMEN IMPACTION: ICD-10-CM

## 2024-05-22 DIAGNOSIS — I87.2 STASIS DERMATITIS OF BOTH LEGS: Primary | ICD-10-CM

## 2024-05-22 DIAGNOSIS — I87.2 VENOUS INSUFFICIENCY: ICD-10-CM

## 2024-05-22 DIAGNOSIS — R60.0 LOWER EXTREMITY EDEMA: ICD-10-CM

## 2024-05-22 PROCEDURE — 1159F MED LIST DOCD IN RCRD: CPT | Mod: ,,, | Performed by: SPECIALIST

## 2024-05-22 PROCEDURE — 1160F RVW MEDS BY RX/DR IN RCRD: CPT | Mod: CPTII,,, | Performed by: FAMILY MEDICINE

## 2024-05-22 PROCEDURE — 4010F ACE/ARB THERAPY RXD/TAKEN: CPT | Mod: CPTII,,, | Performed by: FAMILY MEDICINE

## 2024-05-22 PROCEDURE — 99214 OFFICE O/P EST MOD 30 MIN: CPT | Mod: S$PBB,,, | Performed by: FAMILY MEDICINE

## 2024-05-22 PROCEDURE — 3066F NEPHROPATHY DOC TX: CPT | Mod: CPTII,,, | Performed by: FAMILY MEDICINE

## 2024-05-22 PROCEDURE — 93970 EXTREMITY STUDY: CPT | Mod: TC

## 2024-05-22 PROCEDURE — 1159F MED LIST DOCD IN RCRD: CPT | Mod: CPTII,,, | Performed by: FAMILY MEDICINE

## 2024-05-22 PROCEDURE — 69209 REMOVE IMPACTED EAR WAX UNI: CPT | Mod: RT,GC,, | Performed by: SPECIALIST

## 2024-05-22 PROCEDURE — 3008F BODY MASS INDEX DOCD: CPT | Mod: ,,, | Performed by: SPECIALIST

## 2024-05-22 PROCEDURE — 3078F DIAST BP <80 MM HG: CPT | Mod: ,,, | Performed by: SPECIALIST

## 2024-05-22 PROCEDURE — 3066F NEPHROPATHY DOC TX: CPT | Mod: ,,, | Performed by: SPECIALIST

## 2024-05-22 PROCEDURE — 3051F HG A1C>EQUAL 7.0%<8.0%: CPT | Mod: ,,, | Performed by: SPECIALIST

## 2024-05-22 PROCEDURE — 3051F HG A1C>EQUAL 7.0%<8.0%: CPT | Mod: CPTII,,, | Performed by: FAMILY MEDICINE

## 2024-05-22 PROCEDURE — 93970 EXTREMITY STUDY: CPT | Mod: 26,,, | Performed by: FAMILY MEDICINE

## 2024-05-22 PROCEDURE — 4010F ACE/ARB THERAPY RXD/TAKEN: CPT | Mod: ,,, | Performed by: SPECIALIST

## 2024-05-22 PROCEDURE — 3074F SYST BP LT 130 MM HG: CPT | Mod: ,,, | Performed by: SPECIALIST

## 2024-05-22 PROCEDURE — 99213 OFFICE O/P EST LOW 20 MIN: CPT | Mod: PBBFAC,25 | Performed by: FAMILY MEDICINE

## 2024-05-22 PROCEDURE — 99213 OFFICE O/P EST LOW 20 MIN: CPT | Mod: 25,GC,, | Performed by: SPECIALIST

## 2024-05-22 PROCEDURE — 3060F POS MICROALBUMINURIA REV: CPT | Mod: CPTII,,, | Performed by: FAMILY MEDICINE

## 2024-05-22 PROCEDURE — 3060F POS MICROALBUMINURIA REV: CPT | Mod: ,,, | Performed by: SPECIALIST

## 2024-05-22 RX ORDER — ETHYL ALCOHOL 70 %
SOLUTION, NON-ORAL TOPICAL
Qty: 113 G | Refills: 0 | Status: SHIPPED | OUTPATIENT
Start: 2024-05-22

## 2024-05-22 RX ORDER — FLASH GLUCOSE SENSOR
1 KIT MISCELLANEOUS
Qty: 6 KIT | Refills: 3 | Status: SHIPPED | OUTPATIENT
Start: 2024-05-22

## 2024-05-22 RX ORDER — TRIAMCINOLONE ACETONIDE 1 MG/G
CREAM TOPICAL 2 TIMES DAILY
Qty: 80 G | Refills: 1 | Status: SHIPPED | OUTPATIENT
Start: 2024-05-22 | End: 2024-06-05

## 2024-05-22 RX ORDER — FLASH GLUCOSE SENSOR
KIT MISCELLANEOUS
COMMUNITY
Start: 2024-05-17 | End: 2024-05-22 | Stop reason: SDUPTHER

## 2024-05-22 NOTE — PROGRESS NOTES
VEIN CENTER CLINIC NOTE    Patient ID: Brendon Norris is a 63 y.o. male.    I. HISTORY     Chief Complaint:   Chief Complaint   Patient presents with    Follow-up     US KAYLEIGH COMP         HPI: Brendon Norris is a 63 y.o. male who  presents today for follow-up and results to a bilateral complete venous reflux study.  The study performed today 05/22/2024, was discussed with the patient.  This study shows no evidence of DVT bilaterally.  The study also shows dilation and axial reflux of the bilateral great saphenous veins with associated refluxing varicosities.  No reflux noted in the bilateral small saphenous veins.    Patient was initially seen on 05/16/2024 by referral from  [facility] for evaluation of bilateral lower extremity swelling, hyperpigmentation, varicose veins and discomfort..  Symptoms are persistent and began greater than 5 years ago.  Location is bilateral lower extremities below the knees. Symptoms are worse at the end of the day.  History of venous interventions includes none.  Unknown family history of venous disease.  Patient denies history of DVT or cellulitis.  The patient also sees Dr. Ta, [facility], and has undergone cardiac stenting about 2 years ago in his currently on 81 mg aspirin.    The patient underwent arterial duplex with ABIs on 02/21/2023 which showed a right ISAAC 1.17 and 1.24 on the left.  No arterial occlusions demonstrated.  Mild-to-moderate arterial velocity elevations noted bilaterally.  Moderately prominent lymph node within the left upper thigh.    Venous Disease Medical Necessity Documentation Initial Visit Date:  05/16/2024 Return Check Date:    Have you ever had a rupture or bleed from a varicose vein in your leg(s)?              [] Yes  [x] No   [] Yes   [] No   Have you ever been diagnosed with phlebitis, cellulitis, or inflammation in the area of the varicose veins of  your leg(s)?  [] Yes  [x] No    [] Yes   [] No   Do you have darkened or inflamed skin  on your legs?   [x] Yes   [] No   [] Yes   [] No   Do you have leg swelling?     [x] Yes   [] No   [] Yes   [] No   Do you have leg pain?   [x] Yes   [] No   [] Yes   [] No   If yes, describe the type of pain?    []   Stabbing []  Radiating [x]  Aching   []  Tightness []  Throbbing               []  Burning []  Cramping              Do you have leg discomfort?   [x] Yes   [] No   [] Yes   [] No   If yes, describe the type of discomfort?    []  Heaviness []  Fullness   [x]  Restlessness [x] Tired/Fatigued [x] Itching              Have you ever worn compression hose?   [x] Yes   [] No   [] Yes   [] No   If yes, how long?  1 MONTH         Do you elevate your legs while sitting?   [x] Yes   [] No   [] Yes   [] No   Does venous disease (varicose veins, ulcers, skin changes, leg pain/swelling) interfere with your daily life?  If yes, check activities you are limited or unable to do.    []  Shower  []   Walk  []  Exercise  [] Play with children/grandchildren  []  Shop [] Work [] Stand for any period of time [x] Sleep                               [] Sitting for an extended period of time.           [x] Yes   [] No   [] Yes   [] No   Do you exercise/have you tried to exercise (i.e.  Walk our participate in a regular exercise routine)?  [] Yes  [x] No   [] Yes   [] No   BMI   33.83           Past Medical History:   Diagnosis Date    Coronary artery disease     Diabetes mellitus, type 2     GERD (gastroesophageal reflux disease)     Hyperlipidemia     Hypertension         Past Surgical History:   Procedure Laterality Date    LEFT ARM      LEFT TOTAL KNEE       RIGHT SHOULDER Left        Social History     Tobacco Use   Smoking Status Former    Current packs/day: 1.00    Average packs/day: 1 pack/day for 1 year (1.0 ttl pk-yrs)    Types: Cigarettes   Smokeless Tobacco Never         Current Outpatient Medications:     albuterol (PROVENTIL/VENTOLIN HFA) 90 mcg/actuation inhaler, Inhale 2 puffs into the lungs 4 (four) times daily  "as needed for Wheezing., Disp: 18 g, Rfl: 5    amLODIPine (NORVASC) 10 MG tablet, Take 1 tablet (10 mg total) by mouth once daily., Disp: 90 tablet, Rfl: 3    amLODIPine (NORVASC) 5 MG tablet, Take 1 tablet by mouth twice daily, Disp: 180 tablet, Rfl: 0    aspirin-calcium carbonate 81 mg-300 mg calcium(777 mg) Tab, Take 81 mg by mouth once daily., Disp: , Rfl:     BLOOD-GLUCOSE METER,CONTINUOUS MISC, 1 Device by NOT APPLICABLE route., Disp: , Rfl:     dapagliflozin (FARXIGA) 10 mg tablet, Take 10 mg by mouth once daily., Disp: , Rfl:     esomeprazole (NEXIUM) 40 MG capsule, Take 1 capsule by mouth once daily, Disp: 90 capsule, Rfl: 0    ezetimibe (ZETIA) 10 mg tablet, Take 10 mg by mouth., Disp: , Rfl:     fluconazole (DIFLUCAN) 150 MG Tab, Take 1 tablet (150 mg total) by mouth every 7 days. for 4 doses, Disp: 4 tablet, Rfl: 0    FREESTYLE URVASHI 2 READER Misc, 1 Device., Disp: , Rfl:     FREESTYLE URVASHI 2 SENSOR Kit, APPLY EVERY 14 DAYS, Disp: , Rfl:     insulin detemir U-100, Levemir, (LEVEMIR FLEXPEN) 100 unit/mL (3 mL) InPn pen, INJECT 80 UNITS SUBCUTANEOUSLY AT BEDTIME . DO NOT EXCEED 90 PER 24 HOURS, Disp: , Rfl:     LANTUS SOLOSTAR U-100 INSULIN glargine 100 units/mL SubQ pen, Sliding scale, Disp: 3 mL, Rfl: 9    metFORMIN (GLUCOPHAGE-XR) 500 MG ER 24hr tablet, Take 2 tablets by mouth twice daily, Disp: 180 tablet, Rfl: 0    metoprolol tartrate (LOPRESSOR) 25 MG tablet, Take 1 tablet (25 mg total) by mouth 2 (two) times daily., Disp: 180 tablet, Rfl: 1    nebivoloL (BYSTOLIC) 10 MG Tab, Take 10 mg by mouth., Disp: , Rfl:     nitroGLYCERIN (NITROSTAT) 0.4 MG SL tablet, Place 1 tablet under the tongue every 5 (five) minutes as needed., Disp: , Rfl:     pen needle, diabetic 31 gauge x 1/4" Ndle, pen needle, diabetic 31 gauge x 1/4"  USE AS DIRECTED 4 TIMES DAILY, Disp: , Rfl:     pen needle, diabetic 31 gauge x 5/16" Ndle, Inject 1 Units into the skin 4 (four) times daily., Disp: 100 each, Rfl: 1    rosuvastatin " (CRESTOR) 40 MG Tab, Take 1 tablet (40 mg total) by mouth nightly., Disp: 90 tablet, Rfl: 1    tiZANidine (ZANAFLEX) 4 MG tablet, tizanidine 4 mg tablet  TAKE 1 TABLET BY MOUTH EVERY 6 HOURS AS NEEDED, Disp: , Rfl:     insulin aspart U-100 (NOVOLOG FLEXPEN U-100 INSULIN) 100 unit/mL (3 mL) InPn pen, Inject 16 Units into the skin 3 (three) times daily. Inject 16 units at breakfast, 24 units at lunch, and 30 units at supper. Plus sliding scale of 2 units/50 > 150. Max total daily dose of 100 units., Disp: 43.2 mL, Rfl: 1    olmesartan (BENICAR) 40 MG tablet, Take 1 tablet (40 mg total) by mouth once daily., Disp: 90 tablet, Rfl: 3    Review of Systems   Constitutional:  Negative for activity change, chills, diaphoresis, fatigue and fever.   Respiratory:  Negative for cough and shortness of breath.    Cardiovascular:  Positive for leg swelling. Negative for chest pain and claudication.        Hyperpigmentation LE   Gastrointestinal:  Negative for nausea and vomiting.   Musculoskeletal:  Positive for leg pain. Negative for joint swelling.   Integumentary:  Negative for rash and wound.   Neurological:  Negative for weakness and numbness.          II. PHYSICAL EXAM     Physical Exam  Constitutional:       General: He is awake. He is not in acute distress.     Appearance: Normal appearance. He is overweight. He is not ill-appearing or toxic-appearing.   HENT:      Head: Normocephalic and atraumatic.   Eyes:      Extraocular Movements: Extraocular movements intact.      Conjunctiva/sclera: Conjunctivae normal.      Pupils: Pupils are equal, round, and reactive to light.   Neck:      Vascular: No carotid bruit or JVD.   Cardiovascular:      Rate and Rhythm: Normal rate and regular rhythm.      Pulses:           Dorsalis pedis pulses are detected w/ Doppler on the right side and detected w/ Doppler on the left side.        Posterior tibial pulses are detected w/ Doppler on the right side and detected w/ Doppler on the left  side.      Heart sounds: No murmur heard.  Pulmonary:      Effort: Pulmonary effort is normal. No respiratory distress.      Breath sounds: No stridor. No wheezing, rhonchi or rales.   Musculoskeletal:         General: No swelling, tenderness or deformity.      Right lower le+ Edema present.      Left lower le+ Edema present.      Comments: Edema of the bilateral lower extremities without evidence of open ulceration or cellulitis.   Feet:      Comments: Triphasic hand-held dopplerable pulses of the bilateral dorsalis pedis and posterior tibial arteries.  Skin:     General: Skin is warm.      Capillary Refill: Capillary refill takes less than 2 seconds.      Coloration: Skin is not ashen.      Findings: No bruising, erythema, lesion, rash or wound.   Neurological:      Mental Status: He is alert and oriented to person, place, and time.      Motor: No weakness.   Psychiatric:         Speech: Speech normal.         Behavior: Behavior normal. Behavior is cooperative.       Reticular/Spider veins noted:  RLE: anterior calf, medial calf, and ankle  LLE: anterior calf, medial calf, and ankle    Varicose veins noted:  RLE: medial calf  LLE:  medial calf    CEAP Classification  Clinical Signs: Class 4 - Skin changes ascribed to venous disease  Etiologic Classification: Primary  Anatomic distribution: Superficial  Pathophysiologic dysfunction: Reflux                         Venous Clinical Severity Score  Pain:2=Daily, moderate activity limitation, occasional analgesics  Varicose Veins: 1=Few, scattered. Branch varicose veins  Venous Edema: 2=Afternoon edema, above ankle  Pigmentation: 1=Diffuse, but limited in area and old (brown)  Inflammation: 0=None  Induration: 0=None  Number of Active Ulcers: 0=0  Active Ulceration, Duration: 0=None  Active Ulcer Size: 0=None  Compressive Therapy: 0=Not used or not compliant  Total Score: 6       III. ASSESSMENT & PLAN (MEDICAL DECISION MAKING)     1. Venous insufficiency    2.  Lower extremity edema    3. Pain in both lower extremities    4. Hyperpigmentation of skin        Assessment/Diagnosis and Plan:  Ultrasound of lower extremities reveals  positive evidence of venous insufficiency in the bilateral  great saphenous veins and associated refluxing varicosities.  Plan for conservative medical treatment at this time. The patient may benefit from endovenous ablation in the future.     - Start compression with 20-30mmHg Rx stockings  - Therapeutic leg elevation  - Calf pumping exercises  - RTC 3 months for further evaluation          Samm Ghosh, DO

## 2024-05-22 NOTE — ASSESSMENT & PLAN NOTE
Monitor - Monitor for expanding or worsening rash    Evaluate - Physical exam demonstrates a rash on the dorsal left foot and right medial ankle. Patient has been using Diflucan weekly for suspected tinea corporis and tinea pedis with minimal resolution.     Assessment - Rash likely to be due to stasis dermatitis in light of venous insufficiency on recent venous reflux study and Dr. Ghosh evaluation     Treat - Using Triamcinolone 0.1% BID for 2 weeks on lesions. Return in 1 week to re-check lesions. Sending in Vitamin A + D ointment to be used on rest of skin area of bilateral lower extremities for dry skin

## 2024-05-22 NOTE — ASSESSMENT & PLAN NOTE
5/55/2024 with Dr. Ghosh:   Ultrasound of lower extremities reveals  positive evidence of venous insufficiency in the bilateral  great saphenous veins and associated refluxing varicosities.  Plan for conservative medical treatment at this time. The patient may benefit from endovenous ablation in the future.      - Start compression with 20-30mmHg Rx stockings  - Therapeutic leg elevation  - Calf pumping exercises  - RTC 3 months for further evaluation

## 2024-05-22 NOTE — ASSESSMENT & PLAN NOTE
Monitor - Monitor for worsening cerumen in bilateral ear canals/hearing loss    Evaluate - Physical exam demonstrated cerumen impaction of the RT ear canal     Assessment - Did an elephant ear wash of the right ear with resolution of the impacted cerumen.

## 2024-05-22 NOTE — PROGRESS NOTES
Subjective     Patient ID: Brendon Norris is a 63 y.o. male.    Chief Complaint: Follow-up ( ) and Medication Problem (WANTS HIS FREESYTLE SENSORS CHANGED TO MONTHLY AND NOT EVERY 14 DAYS.)    Patient is a 63 year old  male who presents to the clinic for a follow-up.     DIABETES  The patient is wanting refills on his FreeStyle Sung 2 sensors for a 3 month supply     BILATERAL LOWER EXTREMITY RASHES  The patient has been taking Diflucan every week for the last 2 weeks with minimal resolution of symptoms. The patient states that the lesions are not urticarious.    VENOUS INSUFFICIENCY   The patient was recently seen by Dr. Ghosh and evaluated for venous insufficiency. Recommendations at that appointment were for conservative medical treatment including:  Start compression with 20-30mmHg Rx stockings, Therapeutic leg elevation, and Calf pumping exercises. The patient states that he used his compression stockings last week and has been wearing them intermittently. The patient states that the edema has improved somewhat.     DECREASED HEARING ACUITY OF RT EAR  The patient states that he has been having some decreased hearing acuity of the RT ear recently.     Review of Systems   Constitutional:  Negative for chills, fatigue and fever.   HENT:  Negative for nasal congestion, sinus pressure/congestion and sore throat.    Respiratory:  Negative for cough, chest tightness, shortness of breath and wheezing.    Cardiovascular:  Negative for chest pain.   Gastrointestinal:  Negative for abdominal pain, diarrhea, nausea and vomiting.   Genitourinary:  Negative for dysuria.   Integumentary:  Positive for color change and rash.   Neurological:  Negative for dizziness, weakness and headaches.          Objective   Vitals:    05/22/24 1315   BP: 118/62   Pulse: 72   Resp: 17   Temp: 98.1 °F (36.7 °C)       Physical Exam  Constitutional:       Appearance: Normal appearance.   HENT:      Head: Normocephalic and  atraumatic.      Right Ear: There is impacted cerumen.      Left Ear: Tympanic membrane, ear canal and external ear normal.      Mouth/Throat:      Mouth: Mucous membranes are moist.   Eyes:      Extraocular Movements: Extraocular movements intact.      Conjunctiva/sclera: Conjunctivae normal.      Pupils: Pupils are equal, round, and reactive to light.   Cardiovascular:      Rate and Rhythm: Normal rate and regular rhythm.      Pulses: Normal pulses.      Heart sounds: Normal heart sounds.   Pulmonary:      Effort: Pulmonary effort is normal.      Breath sounds: Normal breath sounds. No wheezing, rhonchi or rales.   Abdominal:      Palpations: Abdomen is soft.      Tenderness: There is no abdominal tenderness.   Musculoskeletal:         General: Normal range of motion.      Cervical back: Normal range of motion.   Skin:     General: Skin is warm.      Comments: Lesion present on the RT medial ankle and LT dorsal foot    Neurological:      General: No focal deficit present.      Mental Status: He is alert and oriented to person, place, and time.   Psychiatric:         Mood and Affect: Mood normal.         Behavior: Behavior normal.         Thought Content: Thought content normal.         Judgment: Judgment normal.              Assessment and Plan     1. Stasis dermatitis of both legs  Assessment & Plan:  Monitor - Monitor for expanding or worsening rash    Evaluate - Physical exam demonstrates a rash on the dorsal left foot and right medial ankle. Patient has been using Diflucan weekly for suspected tinea corporis and tinea pedis with minimal resolution.     Assessment - Rash likely to be due to stasis dermatitis in light of venous insufficiency on recent venous reflux study and Dr. Ghosh evaluation     Treat - Using Triamcinolone 0.1% BID for 2 weeks on lesions. Return in 1 week to re-check lesions. Sending in Vitamin A + D ointment to be used on rest of skin area of bilateral lower extremities for dry skin        Orders:  -     triamcinolone acetonide 0.1% (KENALOG) 0.1 % cream; Apply topically 2 (two) times daily. for 14 days  Dispense: 80 g; Refill: 1  -     vitamin A and D ointment; Apply topically as needed for Dry Skin (Use on dry skin of bilateral lower extremities).  Dispense: 113 g; Refill: 0    2. Hearing loss of right ear due to cerumen impaction  Assessment & Plan:  Monitor - Monitor for worsening cerumen in bilateral ear canals/hearing loss    Evaluate - Physical exam demonstrated cerumen impaction of the RT ear canal     Assessment - Did an elephant ear wash of the right ear with resolution of the impacted cerumen.           Orders:  -     Ear wax removal    3. Type 2 diabetes mellitus with hyperglycemia, with long-term current use of insulin  -     FREESTYLE URVASHI 2 SENSOR Kit; Inject 1 kit into the skin every 14 (fourteen) days.  Dispense: 6 kit; Refill: 3  -     Ambulatory referral/consult to Endocrinology; Future; Expected date: 05/29/2024    4. Venous insufficiency  Assessment & Plan:  5/55/2024 with Dr. Ghosh:   Ultrasound of lower extremities reveals  positive evidence of venous insufficiency in the bilateral  great saphenous veins and associated refluxing varicosities.  Plan for conservative medical treatment at this time. The patient may benefit from endovenous ablation in the future.      - Start compression with 20-30mmHg Rx stockings  - Therapeutic leg elevation  - Calf pumping exercises  - RTC 3 months for further evaluation                   Follow up in about 1 week (around 5/29/2024).

## 2024-05-22 NOTE — LETTER
May 22, 2024      [facility]  [address]  [place] [address]  Phone: [phone]  Fax: [phone]       Patient: Brendon Norris   YOB: 1960  Date of Visit: 05/22/2024    To Whom It May Concern:    Shlomo Norris  was at [facility] on 05/22/2024. The patient may return to work/school on 05/23/2024 with no restrictions. If you have any questions or concerns, or if I can be of further assistance, please do not hesitate to contact me.    Sincerely,    Natalie Armstrong MD

## 2024-08-30 DIAGNOSIS — E11.9 TYPE 2 DIABETES MELLITUS WITHOUT COMPLICATION, WITH LONG-TERM CURRENT USE OF INSULIN: ICD-10-CM

## 2024-08-30 DIAGNOSIS — Z79.4 TYPE 2 DIABETES MELLITUS WITHOUT COMPLICATION, WITH LONG-TERM CURRENT USE OF INSULIN: ICD-10-CM

## 2024-09-03 RX ORDER — PEN NEEDLE, DIABETIC 30 GX3/16"
1 NEEDLE, DISPOSABLE MISCELLANEOUS 4 TIMES DAILY
Qty: 100 EACH | Refills: 1 | Status: SHIPPED | OUTPATIENT
Start: 2024-09-03

## 2024-09-09 ENCOUNTER — OFFICE VISIT (OUTPATIENT)
Dept: FAMILY MEDICINE | Facility: CLINIC | Age: 64
End: 2024-09-09
Payer: MEDICARE

## 2024-09-09 VITALS
HEART RATE: 63 BPM | TEMPERATURE: 98 F | OXYGEN SATURATION: 95 % | BODY MASS INDEX: 34.69 KG/M2 | RESPIRATION RATE: 18 BRPM | DIASTOLIC BLOOD PRESSURE: 85 MMHG | SYSTOLIC BLOOD PRESSURE: 160 MMHG | HEIGHT: 67 IN | WEIGHT: 221 LBS

## 2024-09-09 DIAGNOSIS — S80.211A ABRASION OF RIGHT KNEE, INITIAL ENCOUNTER: ICD-10-CM

## 2024-09-09 DIAGNOSIS — E11.9 TYPE 2 DIABETES MELLITUS WITHOUT COMPLICATION, WITH LONG-TERM CURRENT USE OF INSULIN: Primary | ICD-10-CM

## 2024-09-09 DIAGNOSIS — Z79.4 TYPE 2 DIABETES MELLITUS WITHOUT COMPLICATION, WITH LONG-TERM CURRENT USE OF INSULIN: Primary | ICD-10-CM

## 2024-09-09 LAB
ALBUMIN SERPL BCP-MCNC: 4 G/DL (ref 3.5–5)
ALBUMIN/GLOB SERPL: 1.2 {RATIO}
ALP SERPL-CCNC: 59 U/L (ref 45–115)
ALT SERPL W P-5'-P-CCNC: 38 U/L (ref 16–61)
ANION GAP SERPL CALCULATED.3IONS-SCNC: 9 MMOL/L (ref 7–16)
AST SERPL W P-5'-P-CCNC: 22 U/L (ref 15–37)
BASOPHILS # BLD AUTO: 0.1 K/UL (ref 0–0.2)
BASOPHILS NFR BLD AUTO: 1.2 % (ref 0–1)
BILIRUB SERPL-MCNC: 0.6 MG/DL (ref ?–1.2)
BUN SERPL-MCNC: 16 MG/DL (ref 7–18)
BUN/CREAT SERPL: 18 (ref 6–20)
CALCIUM SERPL-MCNC: 9.8 MG/DL (ref 8.5–10.1)
CHLORIDE SERPL-SCNC: 106 MMOL/L (ref 98–107)
CHOLEST SERPL-MCNC: 105 MG/DL (ref 0–200)
CHOLEST/HDLC SERPL: 2.6 {RATIO}
CO2 SERPL-SCNC: 28 MMOL/L (ref 21–32)
CREAT SERPL-MCNC: 0.88 MG/DL (ref 0.7–1.3)
CREAT UR-MCNC: 149 MG/DL (ref 39–259)
DIFFERENTIAL METHOD BLD: ABNORMAL
EGFR (NO RACE VARIABLE) (RUSH/TITUS): 97 ML/MIN/1.73M2
EOSINOPHIL # BLD AUTO: 0.4 K/UL (ref 0–0.5)
EOSINOPHIL NFR BLD AUTO: 4.8 % (ref 1–4)
ERYTHROCYTE [DISTWIDTH] IN BLOOD BY AUTOMATED COUNT: 13 % (ref 11.5–14.5)
EST. AVERAGE GLUCOSE BLD GHB EST-MCNC: 174 MG/DL
GLOBULIN SER-MCNC: 3.4 G/DL (ref 2–4)
GLUCOSE SERPL-MCNC: 108 MG/DL (ref 74–106)
HBA1C MFR BLD HPLC: 7.7 % (ref 4.5–6.6)
HCT VFR BLD AUTO: 44.6 % (ref 40–54)
HDLC SERPL-MCNC: 40 MG/DL (ref 40–60)
HGB BLD-MCNC: 14.2 G/DL (ref 13.5–18)
IMM GRANULOCYTES # BLD AUTO: 0.03 K/UL (ref 0–0.04)
IMM GRANULOCYTES NFR BLD: 0.4 % (ref 0–0.4)
LDLC SERPL CALC-MCNC: 41 MG/DL
LDLC/HDLC SERPL: 1 {RATIO}
LYMPHOCYTES # BLD AUTO: 2.51 K/UL (ref 1–4.8)
LYMPHOCYTES NFR BLD AUTO: 30.1 % (ref 27–41)
MCH RBC QN AUTO: 29 PG (ref 27–31)
MCHC RBC AUTO-ENTMCNC: 31.8 G/DL (ref 32–36)
MCV RBC AUTO: 91.2 FL (ref 80–96)
MICROALBUMIN UR-MCNC: 27.3 MG/DL (ref 0–2.8)
MICROALBUMIN/CREAT RATIO PNL UR: 183.2 MG/G (ref 0–30)
MONOCYTES # BLD AUTO: 0.75 K/UL (ref 0–0.8)
MONOCYTES NFR BLD AUTO: 9 % (ref 2–6)
MPC BLD CALC-MCNC: 10.5 FL (ref 9.4–12.4)
NEUTROPHILS # BLD AUTO: 4.55 K/UL (ref 1.8–7.7)
NEUTROPHILS NFR BLD AUTO: 54.5 % (ref 53–65)
NONHDLC SERPL-MCNC: 65 MG/DL
NRBC # BLD AUTO: 0 X10E3/UL
NRBC, AUTO (.00): 0 %
PLATELET # BLD AUTO: 247 K/UL (ref 150–400)
POTASSIUM SERPL-SCNC: 4.7 MMOL/L (ref 3.5–5.1)
PROT SERPL-MCNC: 7.4 G/DL (ref 6.4–8.2)
RBC # BLD AUTO: 4.89 M/UL (ref 4.6–6.2)
SODIUM SERPL-SCNC: 138 MMOL/L (ref 136–145)
TRIGL SERPL-MCNC: 121 MG/DL (ref 35–150)
VLDLC SERPL-MCNC: 24 MG/DL
WBC # BLD AUTO: 8.34 K/UL (ref 4.5–11)

## 2024-09-09 PROCEDURE — 1160F RVW MEDS BY RX/DR IN RCRD: CPT | Mod: ,,, | Performed by: FAMILY MEDICINE

## 2024-09-09 PROCEDURE — 3060F POS MICROALBUMINURIA REV: CPT | Mod: ,,, | Performed by: FAMILY MEDICINE

## 2024-09-09 PROCEDURE — 1159F MED LIST DOCD IN RCRD: CPT | Mod: ,,, | Performed by: FAMILY MEDICINE

## 2024-09-09 PROCEDURE — 3066F NEPHROPATHY DOC TX: CPT | Mod: ,,, | Performed by: FAMILY MEDICINE

## 2024-09-09 PROCEDURE — 3079F DIAST BP 80-89 MM HG: CPT | Mod: ,,, | Performed by: FAMILY MEDICINE

## 2024-09-09 PROCEDURE — 80061 LIPID PANEL: CPT | Mod: ,,, | Performed by: CLINICAL MEDICAL LABORATORY

## 2024-09-09 PROCEDURE — 3077F SYST BP >= 140 MM HG: CPT | Mod: ,,, | Performed by: FAMILY MEDICINE

## 2024-09-09 PROCEDURE — 90715 TDAP VACCINE 7 YRS/> IM: CPT | Mod: GC,,, | Performed by: FAMILY MEDICINE

## 2024-09-09 PROCEDURE — 80053 COMPREHEN METABOLIC PANEL: CPT | Mod: ,,, | Performed by: CLINICAL MEDICAL LABORATORY

## 2024-09-09 PROCEDURE — 82570 ASSAY OF URINE CREATININE: CPT | Mod: ,,, | Performed by: CLINICAL MEDICAL LABORATORY

## 2024-09-09 PROCEDURE — 3051F HG A1C>EQUAL 7.0%<8.0%: CPT | Mod: ,,, | Performed by: FAMILY MEDICINE

## 2024-09-09 PROCEDURE — 85025 COMPLETE CBC W/AUTO DIFF WBC: CPT | Mod: ,,, | Performed by: CLINICAL MEDICAL LABORATORY

## 2024-09-09 PROCEDURE — 99214 OFFICE O/P EST MOD 30 MIN: CPT | Mod: 25,GC,, | Performed by: FAMILY MEDICINE

## 2024-09-09 PROCEDURE — 4010F ACE/ARB THERAPY RXD/TAKEN: CPT | Mod: ,,, | Performed by: FAMILY MEDICINE

## 2024-09-09 PROCEDURE — 83036 HEMOGLOBIN GLYCOSYLATED A1C: CPT | Mod: ,,, | Performed by: CLINICAL MEDICAL LABORATORY

## 2024-09-09 PROCEDURE — 3008F BODY MASS INDEX DOCD: CPT | Mod: ,,, | Performed by: FAMILY MEDICINE

## 2024-09-09 PROCEDURE — 90471 IMMUNIZATION ADMIN: CPT | Mod: GC,,, | Performed by: FAMILY MEDICINE

## 2024-09-09 PROCEDURE — 82043 UR ALBUMIN QUANTITATIVE: CPT | Mod: ,,, | Performed by: CLINICAL MEDICAL LABORATORY

## 2024-09-09 RX ORDER — INSULIN ASPART 100 [IU]/ML
16 INJECTION, SOLUTION INTRAVENOUS; SUBCUTANEOUS 3 TIMES DAILY
Qty: 43.2 ML | Refills: 1 | Status: SHIPPED | OUTPATIENT
Start: 2024-09-09 | End: 2025-03-08

## 2024-09-10 ENCOUNTER — PATIENT OUTREACH (OUTPATIENT)
Facility: HOSPITAL | Age: 64
End: 2024-09-10
Payer: MEDICARE

## 2024-09-10 NOTE — LETTER
AUTHORIZATION FOR RELEASE OF   CONFIDENTIAL INFORMATION    Dear [facility], [place],    We are seeing Brendon Norris, date of birth 1960, in the clinic at [facility]. Sondra Simpson MD is the patient's PCP. Brendon Norris has an outstanding lab/procedure at the time we reviewed his chart. In order to help keep his health information updated, he has authorized us to request the following medical record(s):        (  )  MAMMOGRAM                                      (  )  COLONOSCOPY      (  )  PAP SMEAR                                          (  )  OUTSIDE LAB RESULTS     (  )  DEXA SCAN                                          ( X)  EYE EXAM            (  )  FOOT EXAM                                          (  )  ENTIRE RECORD     (  )  OUTSIDE IMMUNIZATIONS                 (  )  _______________         Please fax records to Juani Martines LPN, Care Coordinator at [phone].  If you have any questions, please contct Juani at [phone]           Patient Name: Brendon Norris  : 1960  Patient Phone #: [phone]           Brendon Norris  MRN: [id number]  : 1960  Age: 63 y.o.  Sex: male         Patient/Legal Guardian Signature  This signature was collected at 2024    Self     Self  _______________________________   Printed Name/Relationship to Patient      Consent for Examination and Treatment: I hereby authorize the providers and employees of [facility] ([email or web address]) to provide medical treatment/services which includes, but is not limited to, performing and administering tests and diagnostic procedures that are deemed necessary, including, but not limited to, imaging examinations, blood tests and other laboratory procedures as may be required by the hospital, clinic, or may be ordered by my physician(s) or persons working under the general and/or special instructions of my physician(s).      I understand and agree that this consent covers all  authorized persons, including but not limited to physicians, residents, nurse practitioners, physicians' assistants, specialists, consultants, student nurses, and independently contracted physicians, who are called upon by the physician in charge, to carry out the diagnostic procedures and medical or surgical treatment.     I hereby authorize [facility] to retain or dispose of any specimens or tissue, should there be such remaining from any test or procedure.     I hereby authorize and give consent for [facility] providers and employees to take photographs, images or videotapes of such diagnostic, surgical or treatment procedures of Patient as may be required by [facility] or as may be ordered by a physician. I further acknowledge and agree that [facility] may use cameras or other devices for patient monitoring.     I am aware that the practice of medicine is not an exact science, and I acknowledge that no guarantees have been made to me as to the outcome of any tests, procedures or treatment.     Authorization for Release of Information: I understand that my insurance company and/or their agents may need information necessary to make determinations about payment/reimbursement. I hereby provide authorization to release to all insurance companies, their successors, assignees, other parties with whom they may have contracted, or others acting on their behalf, that are involved with payment for any hospital and/or clinic charges incurred by the patient, any information that they request and deem necessary for payment/reimbursement, and/or quality review.  I further authorize the release of my health information to physicians or other health care practitioners on staff who are involved in my health care now and in the future, and to other health care providers, entities, or institutions for the purpose of my continued care and treatment, including referrals.     REGISTRATION AUTHORIZATION  Form No. 43384 (Rev. 7/13/2022)        Medicare Patient's Certification and Authorization to Release Information and Payment Request:  I certify that the information given by me in applying for payment under Title XVIII of the Social Security Act is correct. I authorize any martin of medical or other information about me to release to the Social SecurityAdministration, or its intermediaries or carriers, any information needed for this or a related Medicare claim. I request that payment of authorized benefits be made on my behalf.     Assignment of Insurance Benefits:   I hereby authorize any and all insurance companies, health plans, defined   benefit plans, health insurers or any entity that is or may be responsible for payment of my medical expenses to pay all hospital and medical benefits now due, and to become due and payable to me under any hospital benefits, sick benefits, injury benefits or any other benefit for services rendered to me, including Major Medical Benefits, direct to [facility] and all independently contracted physicians. I assign any and all rights that I may have against any and all insurance companies, health plans, defined benefit plans, health insurers or any entity that is or may be responsible for payment of my medical expenses, including, but not limited to any right to appeal a denial of a claim, any right to bring any action, lawsuit, administrative proceeding, or other cause of action on my behalf. I specifically assign my right to pursue litigation against any and all insurance companies, health plans, defined benefit plans, health insurers or any entity that is or may be responsible for payment of my medical expenses based upon a refusal to pay charges.            E. Valuables: It is understood and agreed that [facility] is not liable for the damage to or loss of any money, jewelry,   documents, dentures, eye glasses, hearing aids, prosthetics, or other property of value.     F. Computer Equipment: I understand and agree that  should I choose to use computer equipment owned by [facility] or if I choose to access the Internet via [facility]s network, I do so at my own risk. [facility] is not responsible for any damage to my computer equipment or to any damages of any type that might arise from my loss of equipment or data.     G. Acceptance of Financial Responsibility:  I agree that in consideration of the services and   supplies that have been   or will be furnished to the patient, I am hereby obligated to pay all charges made for or on the account of the patient according to the standard rates (in effect at the time the services and supplies are delivered) established by [facility], including its Patient Financial Assistance Policy to the extent it is applicable. I understand that I am responsible for all charges, or portions thereof, not covered by insurance or other sources. Patient refunds will be distributed only after balances at all [facility] facilities are paid.     H. Communication Authorization:  I hereby authorize [facility] and its representatives, along with any billing service   or  who may work on their behalf, to contact me on   my cell phone and/or home phone using pre- recorded messages, artificial voice messages, automatic telephone dialing devices or other computer assisted technology, or by electronic      mail, text messaging, or by any other form of electronic communication. This includes, but is not limited to, appointment reminders, yearly physical exam reminders, preventive care reminders, patient campaigns, welcome calls, and calls about account balances on my account or any account on which I am listed as a guarantor. I understand I have the right to opt out of these communications at any time.      Relationship  Between  Facility and  Provider:      I understand that some, but not all, providers furnishing services to the patient are not employees or agents of [facility]. The patient is under the care and  supervision of his/her attending physician, and it is the responsibility of the facility and its nursing staff to carry out the instructions of such physicians. It is the responsibility of the patient's physician/designee to obtain the patient's informed consent, when required, for medical or surgical treatment, special diagnostic or therapeutic procedures, or hospital services rendered for the patient under the special instructions of the physician/designee.     REGISTRATION AUTHORIZATION  Form No. 42917 (Rev. 7/13/2022)      Notice of Privacy Practices: I acknowledge I have received a copy of [facility]'s Notice of Privacy Practices.     Facility  Directory: I have discussed with the organization my desire to be either included or excluded  in the facility directory in the event of my being an inpatient at an [facility] facility. I understand that if my choice is to opt-out of being identified in the facility directory that the facility will not provide any information about me such as my condition (e.g. fair, stable, etc.) or my location in the facility (e.g., room number, department).     Immunizations: [facility] shares immunization information with state sponsored health departments to help you and your doctor keep track of your immunization records. By signing, you consent to have this information shared with the health department in your state:                                [place] - LINKS ([place] Immunization Network for Kids Statewide)                                [place] - MIIX ([place] Immunization Information eXchange)                                [place] - ImmPRINT (Immunization Patient Registry with Integrated Technology)     TERM: This authorization is valid for this and subsequent care/treatment I receive at [facility] and will remain valid unless/until revoked in writing by me.     [facility]: As used in this document, [facility] means all [facility] owned and managed facilities,  including, but not limited to, all health centers, surgery centers, clinics, urgent care centers, and hospitals.         [facility] complies with applicable Federal civil rights laws and does not discriminate on the basis of race, color, national origin, age, disability, or sex.  ATENCIÓN: si habla johnañol, tiene a morales disposición servicios gratuitos de asistencia lingüística. Alicia al [phone].  CHÚ Ý: N?u b?n nói Ti?ng Vi?t, có các d?ch v? h? tr? ngôn ng? mi?n phí dành cho b?n. G?i s? [phone].        REGISTRATION AUTHORIZATION  Form No. 22119 (Rev. 7/13/2022)

## 2024-09-10 NOTE — PROGRESS NOTES
Population Health Chart Review & Patient Outreach Details    Updates Requested / Reviewed:  [x]  Care Team Updated  []  Care Everywhere Updated & Reviewed  []  [facility] Reviewed  []   Reviewed  []  MIIX Reviewed    Health Maintenance Topics Addressed and Outreach Outcomes / Actions Taken:          Diabetic Eye Exam []  Eye Exam Screening Order Placed    []  Eye Camera Scheduled or Optometry/Ophthalmology Referral Placed    [x]  External Records Requested & Care Team Updated if Applicable    []  External Records Uploaded, Care Team Updated, & History Updated if Applicable    []  Patient Declined Scheduling Eye Exam    []  Patient Will Schedule with External Provider & Care Team Updated if Applicable

## 2024-09-16 PROBLEM — Z79.4 TYPE 2 DIABETES MELLITUS WITHOUT COMPLICATION, WITH LONG-TERM CURRENT USE OF INSULIN: Status: ACTIVE | Noted: 2024-09-16

## 2024-09-16 PROBLEM — E11.9 TYPE 2 DIABETES MELLITUS WITHOUT COMPLICATION, WITH LONG-TERM CURRENT USE OF INSULIN: Status: ACTIVE | Noted: 2024-09-16

## 2024-09-16 NOTE — PROGRESS NOTES
I was present and have reviewed the notes, assessments, and/or procedures performed by DR BECKWITH, I concur with his documentation of Brendon Norris.  Date of Service: 9/9/2024     Assessment  1. Type 2 diabetes mellitus without complication, with long-term current use of insulin    2. Abrasion of right knee, initial encounter          Plan:      Plan  Problem List Items Addressed This Visit                  Endocrine     Type 2 diabetes mellitus without complication, with long-term current use of insulin - Primary       E:A1c in the office could be a little tighter.  A:The current medical regimen is effective;  continue present plan and medications.   T:Recommended patient to check home readings to monitor and see me for followup as scheduled or sooner as needed.    Encouraged frequent foot exams at home.   Refer to podiatry.   Encouraged diabetic footwear and no bare feet.   Diabetes treatment goals: (unless otherwise indicated due to risk factor stratification)  To achieve normal or near normal glucose levels.  Fasting glucose:   Before meals: 100-140  2 hours after meal:  Bedtime goal glucose > 100            Relevant Medications     insulin aspart U-100 (NOVOLOG FLEXPEN U-100 INSULIN) 100 unit/mL (3 mL) InPn pen     Other Relevant Orders     Hemoglobin A1C (Completed)     Comprehensive Metabolic Panel (Completed)     CBC Auto Differential (Completed)     Lipid Panel (Completed)     Microalbumin/Creatinine Ratio, Urine (Completed)      Other Visit Diagnoses         Abrasion of right knee, initial encounter         Relevant Medications     Tdap (BOOSTRIX) vaccine injection 0.5 mL (Completed)                Follow up in about 3 months (around 12/9/2024).     MD Puja Bautista D.O.

## 2024-09-16 NOTE — PROGRESS NOTES
"Subjective:       Patient ID: Brendon Lackey is a 63 y.o. male.    Chief Complaint: Medication Refill    Mr lackey is a  y/o male who presents for 6 mos follow up of [place]ib[place], HTN, HLD he does not have complaint today          Current Outpatient Medications:     albuterol (PROVENTIL/VENTOLIN HFA) 90 mcg/actuation inhaler, Inhale 2 puffs into the lungs 4 (four) times daily as needed for Wheezing., Disp: 18 g, Rfl: 5    amLODIPine (NORVASC) 10 MG tablet, Take 1 tablet (10 mg total) by mouth once daily., Disp: 90 tablet, Rfl: 3    aspirin-calcium carbonate 81 mg-300 mg calcium(777 mg) Tab, Take 81 mg by mouth once daily., Disp: , Rfl:     dapagliflozin (FARXIGA) 10 mg tablet, Take 10 mg by mouth once daily., Disp: , Rfl:     ezetimibe (ZETIA) 10 mg tablet, Take 10 mg by mouth., Disp: , Rfl:     FREESTYLE URVASHI 2 READER Misc, 1 Device., Disp: , Rfl:     FREESTYLE URVASHI 2 SENSOR Kit, Inject 1 kit into the skin every 14 (fourteen) days., Disp: 6 kit, Rfl: 3    LANTUS SOLOSTAR U-100 INSULIN glargine 100 units/mL SubQ pen, Sliding scale, Disp: 3 mL, Rfl: 9    metFORMIN (GLUCOPHAGE-XR) 500 MG ER 24hr tablet, Take 2 tablets by mouth twice daily, Disp: 180 tablet, Rfl: 0    metoprolol tartrate (LOPRESSOR) 25 MG tablet, Take 1 tablet (25 mg total) by mouth 2 (two) times daily., Disp: 180 tablet, Rfl: 1    nebivoloL (BYSTOLIC) 10 MG Tab, Take 10 mg by mouth., Disp: , Rfl:     nitroGLYCERIN (NITROSTAT) 0.4 MG SL tablet, Place 1 tablet under the tongue every 5 (five) minutes as needed., Disp: , Rfl:     pen needle, diabetic 31 gauge x 1/4" Ndle, pen needle, diabetic 31 gauge x 1/4"  USE AS DIRECTED 4 TIMES DAILY, Disp: , Rfl:     pen needle, diabetic 31 gauge x 5/16" Ndle, Inject 1 Units into the skin 4 (four) times daily., Disp: 100 each, Rfl: 1    rosuvastatin (CRESTOR) 40 MG Tab, Take 1 tablet (40 mg total) by mouth nightly., Disp: 90 tablet, Rfl: 1    tiZANidine (ZANAFLEX) 4 MG tablet, tizanidine 4 mg tablet  TAKE 1 TABLET BY " MOUTH EVERY 6 HOURS AS NEEDED, Disp: , Rfl:     amLODIPine (NORVASC) 5 MG tablet, Take 1 tablet by mouth twice daily (Patient not taking: Reported on 9/9/2024), Disp: 180 tablet, Rfl: 0    BLOOD-GLUCOSE METER,CONTINUOUS MISC, 1 Device by NOT APPLICABLE route. (Patient not taking: Reported on 9/9/2024), Disp: , Rfl:     esomeprazole (NEXIUM) 40 MG capsule, Take 1 capsule by mouth once daily (Patient not taking: Reported on 9/9/2024), Disp: 90 capsule, Rfl: 0    insulin aspart U-100 (NOVOLOG FLEXPEN U-100 INSULIN) 100 unit/mL (3 mL) InPn pen, Inject 16 Units into the skin 3 (three) times daily. Inject 16 units at breakfast, 24 units at lunch, and 30 units at supper. Plus sliding scale of 2 units/50 > 150. Max total daily dose of 100 units., Disp: 43.2 mL, Rfl: 1    olmesartan (BENICAR) 40 MG tablet, Take 1 tablet (40 mg total) by mouth once daily., Disp: 90 tablet, Rfl: 3    triamcinolone acetonide 0.1% (KENALOG) 0.1 % cream, Apply topically 2 (two) times daily. for 14 days, Disp: 80 g, Rfl: 1    vitamin A and D ointment, Apply topically as needed for Dry Skin (Use on dry skin of bilateral lower extremities). (Patient not taking: Reported on 9/9/2024), Disp: 113 g, Rfl: 0    Review of patient's allergies indicates:   Allergen Reactions    Lisinopril      Other reaction(s): Cough       Past Medical History:   Diagnosis Date    Coronary artery disease     Diabetes mellitus, type 2     GERD (gastroesophageal reflux disease)     Hyperlipidemia     Hypertension        Past Surgical History:   Procedure Laterality Date    LEFT ARM      LEFT TOTAL KNEE       RIGHT SHOULDER Left        Family History   Problem Relation Name Age of Onset    Learning disabilities Son         Social History     Tobacco Use    Smoking status: Former     Current packs/day: 1.00     Average packs/day: 1 pack/day for 1 year (1.0 ttl pk-yrs)     Types: Cigarettes    Smokeless tobacco: Never   Substance Use Topics    Alcohol use: Not Currently    Drug  use: Not Currently       Review of Systems   Constitutional:  Negative for diaphoresis, fatigue, fever and unexpected weight change.   HENT:  Negative for rhinorrhea, sinus pressure/congestion and sneezing.    Respiratory:  Negative for cough, chest tightness, shortness of breath and wheezing.    Cardiovascular:  Negative for chest pain, palpitations and leg swelling.   Gastrointestinal:  Negative for constipation, diarrhea, nausea and vomiting.   Genitourinary:  Positive for frequency. Negative for difficulty urinating.   Musculoskeletal:  Negative for back pain.   Neurological:  Negative for dizziness, weakness, light-headedness and headaches.         Current Medications:   Medication List with Changes/Refills   Current Medications    ALBUTEROL (PROVENTIL/VENTOLIN HFA) 90 MCG/ACTUATION INHALER    Inhale 2 puffs into the lungs 4 (four) times daily as needed for Wheezing.       Start Date: 9/26/2022 End Date: --    AMLODIPINE (NORVASC) 10 MG TABLET    Take 1 tablet (10 mg total) by mouth once daily.       Start Date: 5/8/2024  End Date: 5/8/2025    AMLODIPINE (NORVASC) 5 MG TABLET    Take 1 tablet by mouth twice daily       Start Date: 6/1/2023  End Date: --    ASPIRIN-CALCIUM CARBONATE 81 MG-300 MG CALCIUM(777 MG) TAB    Take 81 mg by mouth once daily.       Start Date: --        End Date: --    BLOOD-GLUCOSE METER,CONTINUOUS MISC    1 Device by NOT APPLICABLE route.       Start Date: 1/26/2023 End Date: --    DAPAGLIFLOZIN (FARXIGA) 10 MG TABLET    Take 10 mg by mouth once daily.       Start Date: --        End Date: --    ESOMEPRAZOLE (NEXIUM) 40 MG CAPSULE    Take 1 capsule by mouth once daily       Start Date: 6/1/2023  End Date: --    EZETIMIBE (ZETIA) 10 MG TABLET    Take 10 mg by mouth.       Start Date: 12/22/2022End Date: --    FREESTYLE URVASHI 2 READER MISC    1 Device.       Start Date: 1/30/2023 End Date: --    FREESTYLE URVASHI 2 SENSOR KIT    Inject 1 kit into the skin every 14 (fourteen) days.        "Start Date: 5/22/2024 End Date: --    LANTUS SOLOSTAR U-100 INSULIN GLARGINE 100 UNITS/ML SUBQ PEN    Sliding scale       Start Date: 11/15/2023End Date: --    METFORMIN (GLUCOPHAGE-XR) 500 MG ER 24HR TABLET    Take 2 tablets by mouth twice daily       Start Date: 4/3/2024  End Date: --    METOPROLOL TARTRATE (LOPRESSOR) 25 MG TABLET    Take 1 tablet (25 mg total) by mouth 2 (two) times daily.       Start Date: 9/26/2022 End Date: --    NEBIVOLOL (BYSTOLIC) 10 MG TAB    Take 10 mg by mouth.       Start Date: 12/22/2022End Date: --    NITROGLYCERIN (NITROSTAT) 0.4 MG SL TABLET    Place 1 tablet under the tongue every 5 (five) minutes as needed.       Start Date: 12/22/2022End Date: --    OLMESARTAN (BENICAR) 40 MG TABLET    Take 1 tablet (40 mg total) by mouth once daily.       Start Date: 3/28/2023 End Date: 3/27/2024    PEN NEEDLE, DIABETIC 31 GAUGE X 1/4" NDLE    pen needle, diabetic 31 gauge x 1/4"   USE AS DIRECTED 4 TIMES DAILY       Start Date: --        End Date: --    PEN NEEDLE, DIABETIC 31 GAUGE X 5/16" NDLE    Inject 1 Units into the skin 4 (four) times daily.       Start Date: 9/3/2024  End Date: --    ROSUVASTATIN (CRESTOR) 40 MG TAB    Take 1 tablet (40 mg total) by mouth nightly.       Start Date: 9/26/2022 End Date: --    TIZANIDINE (ZANAFLEX) 4 MG TABLET    tizanidine 4 mg tablet   TAKE 1 TABLET BY MOUTH EVERY 6 HOURS AS NEEDED       Start Date: --        End Date: --    TRIAMCINOLONE ACETONIDE 0.1% (KENALOG) 0.1 % CREAM    Apply topically 2 (two) times daily. for 14 days       Start Date: 5/22/2024 End Date: 6/5/2024    VITAMIN A AND D OINTMENT    Apply topically as needed for Dry Skin (Use on dry skin of bilateral lower extremities).       Start Date: 5/22/2024 End Date: --   Changed and/or Refilled Medications    Modified Medication Previous Medication    INSULIN ASPART U-100 (NOVOLOG FLEXPEN U-100 INSULIN) 100 UNIT/ML (3 ML) INPN PEN insulin aspart U-100 (NOVOLOG FLEXPEN U-100 INSULIN) 100 " "unit/mL (3 mL) InPn pen       Inject 16 Units into the skin 3 (three) times daily. Inject 16 units at breakfast, 24 units at lunch, and 30 units at supper. Plus sliding scale of 2 units/50 > 150. Max total daily dose of 100 units.    Inject 16 Units into the skin 3 (three) times daily. Inject 16 units at breakfast, 24 units at lunch, and 30 units at supper. Plus sliding scale of 2 units/50 > 150. Max total daily dose of 100 units.       Start Date: 9/9/2024  End Date: 3/8/2025    Start Date: 1/24/2023 End Date: 9/9/2024            Objective:        Vitals:    09/09/24 1549 09/09/24 1551   BP: (!) 152/82 (!) 160/85   BP Location: Right arm Left arm   Patient Position: Sitting Sitting   BP Method: Large (Automatic)    Pulse: 63    Resp: 18    Temp: 98 °F (36.7 °C)    TempSrc: Oral    SpO2: 95%    Weight: 100.2 kg (221 lb)    Height: 5' 7" (1.702 m)        Physical Exam  Constitutional:       Appearance: Normal appearance.   HENT:      Head: Normocephalic and atraumatic.      Right Ear: External ear normal.      Left Ear: External ear normal.      Mouth/Throat:      Mouth: Mucous membranes are moist.      Pharynx: Oropharynx is clear.   Eyes:      Extraocular Movements: Extraocular movements intact.      Pupils: Pupils are equal, round, and reactive to light.   Cardiovascular:      Rate and Rhythm: Normal rate and regular rhythm.      Heart sounds: Normal heart sounds.   Pulmonary:      Effort: Pulmonary effort is normal.      Breath sounds: Normal breath sounds.   Abdominal:      Palpations: Abdomen is soft.   Musculoskeletal:      Cervical back: Neck supple.   Skin:     General: Skin is warm and dry.      Capillary Refill: Capillary refill takes less than 2 seconds.   Neurological:      Mental Status: He is alert and oriented to person, place, and time.   Psychiatric:         Mood and Affect: Mood normal.         Behavior: Behavior normal.                Assessment:       1. Type 2 diabetes mellitus without " complication, with long-term current use of insulin    2. Abrasion of right knee, initial encounter        Plan:         Problem List Items Addressed This Visit          Endocrine    Type 2 diabetes mellitus without complication, with long-term current use of insulin - Primary     The current medical regimen is effective;  continue present plan and medications. Recommended patient to check home readings to monitor and see me for followup as scheduled or sooner as needed.    Encouraged frequent foot exams at home. Refer to podiatry. Encouraged diabetic footwear and no bare feet.   Diabetes treatment goals: (unless otherwise indicated due to risk factor stratification)  To achieve normal or near normal glucose levels.  Fasting glucose:   Before meals: 100-140  2 hours after meal:  Bedtime goal glucose > 100          Relevant Medications    insulin aspart U-100 (NOVOLOG FLEXPEN U-100 INSULIN) 100 unit/mL (3 mL) InPn pen    Other Relevant Orders    Hemoglobin A1C (Completed)    Comprehensive Metabolic Panel (Completed)    CBC Auto Differential (Completed)    Lipid Panel (Completed)    Microalbumin/Creatinine Ratio, Urine (Completed)     Other Visit Diagnoses       Abrasion of right knee, initial encounter        Relevant Medications    Tdap (BOOSTRIX) vaccine injection 0.5 mL (Completed)              Follow up in about 3 months (around 12/9/2024).    Sondra Simpson MD     Instructed patient that if symptoms fail to improve or worsen patient should seek immediate medical attention or report to the nearest emergency department. Patient expressed verbal agreement and understanding to this plan of care.

## 2025-01-10 ENCOUNTER — PATIENT OUTREACH (OUTPATIENT)
Facility: HOSPITAL | Age: 65
End: 2025-01-10
Payer: MEDICARE

## 2025-01-10 NOTE — PROGRESS NOTES
Blood Pressure Control []  Primary Care Follow Up Visit Scheduled     []  Remote Blood Pressure Reading Captured    []  Patient Declined Remote Reading or Scheduling Appt - Escalated to PCP    [x]  Patient Will Call Back or Send Portal Message with Reading     *01/10/2025 Telephone outreach and my chart outreach to follow up on elevated blood pressure reading at last OV. Left VM to return my my chart message or return my call**

## 2025-03-20 ENCOUNTER — APPOINTMENT (OUTPATIENT)
Dept: URBAN - METROPOLITAN AREA CLINIC 304 | Facility: CLINIC | Age: 65
Setting detail: DERMATOLOGY
End: 2025-03-20

## 2025-03-20 DIAGNOSIS — L30.0 NUMMULAR DERMATITIS: ICD-10-CM | Status: INADEQUATELY CONTROLLED

## 2025-03-20 DIAGNOSIS — L82.1 OTHER SEBORRHEIC KERATOSIS: ICD-10-CM | Status: STABLE

## 2025-03-20 DIAGNOSIS — L57.8 OTHER SKIN CHANGES DUE TO CHRONIC EXPOSURE TO NONIONIZING RADIATION: ICD-10-CM | Status: STABLE

## 2025-03-20 DIAGNOSIS — D22 MELANOCYTIC NEVI: ICD-10-CM | Status: STABLE

## 2025-03-20 PROBLEM — D22.5 MELANOCYTIC NEVI OF TRUNK: Status: ACTIVE | Noted: 2025-03-20

## 2025-03-20 PROCEDURE — ? MEDICATION COUNSELING

## 2025-03-20 PROCEDURE — ? COUNSELING

## 2025-03-20 PROCEDURE — ? PRESCRIPTION MEDICATION MANAGEMENT

## 2025-03-20 PROCEDURE — ? FULL BODY SKIN EXAM

## 2025-03-20 PROCEDURE — ? PRESCRIPTION

## 2025-03-20 PROCEDURE — 99213 OFFICE O/P EST LOW 20 MIN: CPT

## 2025-03-20 PROCEDURE — ? SUNSCREEN TREATMENT REGIMEN

## 2025-03-20 RX ORDER — FLUOCINONIDE 0.5 MG/G
CREAM TOPICAL
Qty: 15 | Refills: 3 | Status: ERX | COMMUNITY
Start: 2025-03-20

## 2025-03-20 RX ADMIN — FLUOCINONIDE: 0.5 CREAM TOPICAL at 00:00

## 2025-03-20 ASSESSMENT — ITCH NUMERIC RATING SCALE: HOW SEVERE IS YOUR ITCHING?: 1

## 2025-03-20 ASSESSMENT — LOCATION SIMPLE DESCRIPTION DERM
LOCATION SIMPLE: RIGHT PRETIBIAL REGION
LOCATION SIMPLE: RIGHT CHEEK
LOCATION SIMPLE: LEFT UPPER BACK

## 2025-03-20 ASSESSMENT — LOCATION ZONE DERM
LOCATION ZONE: TRUNK
LOCATION ZONE: FACE
LOCATION ZONE: LEG

## 2025-03-20 ASSESSMENT — SEVERITY ASSESSMENT: SEVERITY: MILD

## 2025-03-20 ASSESSMENT — LOCATION DETAILED DESCRIPTION DERM
LOCATION DETAILED: LEFT SUPERIOR UPPER BACK
LOCATION DETAILED: RIGHT CENTRAL MALAR CHEEK
LOCATION DETAILED: RIGHT DISTAL PRETIBIAL REGION

## 2025-03-20 ASSESSMENT — BSA RASH: BSA RASH: 1

## 2025-03-20 NOTE — HPI: EVALUATION OF SKIN LESION(S)
Hpi Title: Evaluation of Skin Lesions
Additional History: Pt has one spot of concern on right shin that is scaly and red, spot is not itchy but pt has been using triamcinalone on area and that has not helped spot at all.

## 2025-03-20 NOTE — PROCEDURE: PRESCRIPTION MEDICATION MANAGEMENT
Detail Level: Zone
Plan: Discussed PIH and explained might remain once dermatitis clears
Initiate Treatment: Fluocinonide cream bid x 1-2 weeks
Render In Strict Bullet Format?: No

## 2025-03-20 NOTE — PROCEDURE: MEDICATION COUNSELING
Erythromycin Pregnancy And Lactation Text: This medication is Pregnancy Category B and is considered safe during pregnancy. It is also excreted in breast milk.
Erythromycin Counseling:  I discussed with the patient the risks of erythromycin including but not limited to GI upset, allergic reaction, drug rash, diarrhea, increase in liver enzymes, and yeast infections.
Minocycline Pregnancy And Lactation Text: This medication is Pregnancy Category D and not consider safe during pregnancy. It is also excreted in breast milk.
Include Pregnancy/Lactation Warning?: No
Minocycline Counseling: Patient advised regarding possible photosensitivity and discoloration of the teeth, skin, lips, tongue and gums.  Patient instructed to avoid sunlight, if possible.  When exposed to sunlight, patients should wear protective clothing, sunglasses, and sunscreen.  The patient was instructed to call the office immediately if the following severe adverse effects occur:  hearing changes, easy bruising/bleeding, severe headache, or vision changes.  The patient verbalized understanding of the proper use and possible adverse effects of minocycline.  All of the patient's questions and concerns were addressed.
Metronidazole Pregnancy And Lactation Text: This medication is Pregnancy Category B and considered safe during pregnancy.  It is also excreted in breast milk.
Doxycycline Pregnancy And Lactation Text: This medication is Pregnancy Category D and not consider safe during pregnancy. It is also excreted in breast milk but is considered safe for shorter treatment courses.
Cephalexin Pregnancy And Lactation Text: This medication is Pregnancy Category B and considered safe during pregnancy.  It is also excreted in breast milk but can be used safely for shorter doses.
Cephalexin Counseling: I counseled the patient regarding use of cephalexin as an antibiotic for prophylactic and/or therapeutic purposes. Cephalexin (commonly prescribed under brand name Keflex) is a cephalosporin antibiotic which is active against numerous classes of bacteria, including most skin bacteria. Side effects may include nausea, diarrhea, gastrointestinal upset, rash, hives, yeast infections, and in rare cases, hepatitis, kidney disease, seizures, fever, confusion, neurologic symptoms, and others. Patients with severe allergies to penicillin medications are cautioned that there is about a 10% incidence of cross-reactivity with cephalosporins. When possible, patients with penicillin allergies should use alternatives to cephalosporins for antibiotic therapy.
Metronidazole Counseling:  I discussed with the patient the risks of metronidazole including but not limited to seizures, nausea/vomiting, a metallic taste in the mouth, nausea/vomiting and severe allergy.
Doxycycline Counseling:  Patient counseled regarding possible photosensitivity and increased risk for sunburn.  Patient instructed to avoid sunlight, if possible.  When exposed to sunlight, patients should wear protective clothing, sunglasses, and sunscreen.  The patient was instructed to call the office immediately if the following severe adverse effects occur:  hearing changes, easy bruising/bleeding, severe headache, or vision changes.  The patient verbalized understanding of the proper use and possible adverse effects of doxycycline.  All of the patient's questions and concerns were addressed.
Clindamycin Pregnancy And Lactation Text: This medication can be used in pregnancy if certain situations. Clindamycin is also present in breast milk.
Detail Level: Simple
Bactrim Pregnancy And Lactation Text: This medication is Pregnancy Category D and is known to cause fetal risk.  It is also excreted in breast milk.
Clindamycin Counseling: I counseled the patient regarding use of clindamycin as an antibiotic for prophylactic and/or therapeutic purposes. Clindamycin is active against numerous classes of bacteria, including skin bacteria. Side effects may include nausea, diarrhea, gastrointestinal upset, rash, hives, yeast infections, and in rare cases, colitis.
Azithromycin Counseling:  I discussed with the patient the risks of azithromycin including but not limited to GI upset, allergic reaction, drug rash, diarrhea, and yeast infections.
Bactrim Counseling:  I discussed with the patient the risks of sulfa antibiotics including but not limited to GI upset, allergic reaction, drug rash, diarrhea, dizziness, photosensitivity, and yeast infections.  Rarely, more serious reactions can occur including but not limited to aplastic anemia, agranulocytosis, methemoglobinemia, blood dyscrasias, liver or kidney failure, lung infiltrates or desquamative/blistering drug rashes.
Azithromycin Pregnancy And Lactation Text: This medication is considered safe during pregnancy and is also secreted in breast milk.
Sarecycline Counseling: Patient advised regarding possible photosensitivity and discoloration of the teeth, skin, lips, tongue and gums.  Patient instructed to avoid sunlight, if possible.  When exposed to sunlight, patients should wear protective clothing, sunglasses, and sunscreen.  The patient was instructed to call the office immediately if the following severe adverse effects occur:  hearing changes, easy bruising/bleeding, severe headache, or vision changes.  The patient verbalized understanding of the proper use and possible adverse effects of sarecycline.  All of the patient's questions and concerns were addressed.
Rifampin Pregnancy And Lactation Text: This medication is Pregnancy Category C and it isn't know if it is safe during pregnancy. It is also excreted in breast milk and should not be used if you are breast feeding.
Rifampin Counseling: I discussed with the patient the risks of rifampin including but not limited to liver damage, kidney damage, red-orange body fluids, nausea/vomiting and severe allergy.
Quinolones Pregnancy And Lactation Text: This medication is Pregnancy Category C and it isn't know if it is safe during pregnancy. It is also excreted in breast milk.
Quinolones Counseling:  I discussed with the patient the risks of fluoroquinolones including but not limited to GI upset, allergic reaction, drug rash, diarrhea, dizziness, photosensitivity, yeast infections, liver function test abnormalities, tendonitis/tendon rupture.
Ivermectin Pregnancy And Lactation Text: This medication is Pregnancy Category C and it isn't known if it is safe during pregnancy. It is also excreted in breast milk.
Doxepin Pregnancy And Lactation Text: This medication is Pregnancy Category C and it isn't known if it is safe during pregnancy. It is also excreted in breast milk and breast feeding isn't recommended.
Ivermectin Counseling:  Patient instructed to take medication on an empty stomach with a full glass of water.  Patient informed of potential adverse effects including but not limited to nausea, diarrhea, dizziness, itching, and swelling of the extremities or lymph nodes.  The patient verbalized understanding of the proper use and possible adverse effects of ivermectin.  All of the patient's questions and concerns were addressed.
Ketoconazole Pregnancy And Lactation Text: This medication is Pregnancy Category C and it isn't know if it is safe during pregnancy. It is also excreted in breast milk and breast feeding isn't recommended.
Hydroxyzine Counseling: Patient advised that the medication is sedating and not to drive a car after taking this medication.  Patient informed of potential adverse effects including but not limited to dry mouth, urinary retention, and blurry vision.  The patient verbalized understanding of the proper use and possible adverse effects of hydroxyzine.  All of the patient's questions and concerns were addressed.
Terbinafine Counseling: Patient counseling regarding adverse effects of terbinafine including but not limited to headache, diarrhea, rash, upset stomach, liver function test abnormalities, itching, taste/smell disturbance, nausea, abdominal pain, and flatulence.  There is a rare possibility of liver failure that can occur when taking terbinafine.  The patient understands that a baseline LFT and kidney function test may be required. The patient verbalized understanding of the proper use and possible adverse effects of terbinafine.  All of the patient's questions and concerns were addressed.
Tetracycline Counseling: Patient counseled regarding possible photosensitivity and increased risk for sunburn.  Patient instructed to avoid sunlight, if possible.  When exposed to sunlight, patients should wear protective clothing, sunglasses, and sunscreen.  The patient was instructed to call the office immediately if the following severe adverse effects occur:  hearing changes, easy bruising/bleeding, severe headache, or vision changes.  The patient verbalized understanding of the proper use and possible adverse effects of tetracycline.  All of the patient's questions and concerns were addressed. Patient understands to avoid pregnancy while on therapy due to potential birth defects.
Cimetidine Pregnancy And Lactation Text: This medication is Pregnancy Category B and is considered safe during pregnancy. It is also excreted in breast milk and breast feeding isn't recommended.
Doxepin Counseling:  Patient advised that the medication is sedating and not to drive a car after taking this medication. Patient informed of potential adverse effects including but not limited to dry mouth, urinary retention, and blurry vision.  The patient verbalized understanding of the proper use and possible adverse effects of doxepin.  All of the patient's questions and concerns were addressed.
Ketoconazole Counseling:   Patient counseled regarding improving absorption with orange juice.  Adverse effects include but are not limited to breast enlargement, headache, diarrhea, nausea, upset stomach, liver function test abnormalities, taste disturbance, and stomach pain.  There is a rare possibility of liver failure that can occur when taking ketoconazole. The patient understands that monitoring of LFTs may be required, especially at baseline. The patient verbalized understanding of the proper use and possible adverse effects of ketoconazole.  All of the patient's questions and concerns were addressed.
Itraconazole Counseling:  I discussed with the patient the risks of itraconazole including but not limited to liver damage, nausea/vomiting, neuropathy, and severe allergy.  The patient understands that this medication is best absorbed when taken with acidic beverages such as non-diet cola or ginger ale.  The patient understands that monitoring is required including baseline LFTs and repeat LFTs at intervals.  The patient understands that they are to contact us or the primary physician if concerning signs are noted.
Albendazole Counseling:  I discussed with the patient the risks of albendazole including but not limited to cytopenia, kidney damage, nausea/vomiting and severe allergy.  The patient understands that this medication is being used in an off-label manner.
Griseofulvin Pregnancy And Lactation Text: This medication is Pregnancy Category X and is known to cause serious birth defects. It is unknown if this medication is excreted in breast milk but breast feeding should be avoided.
Cimetidine Counseling:  I discussed with the patient the risks of Cimetidine including but not limited to gynecomastia, headache, diarrhea, nausea, drowsiness, arrhythmias, pancreatitis, skin rashes, psychosis, bone marrow suppression and kidney toxicity.
Griseofulvin Counseling:  I discussed with the patient the risks of griseofulvin including but not limited to photosensitivity, cytopenia, liver damage, nausea/vomiting and severe allergy.  The patient understands that this medication is best absorbed when taken with a fatty meal (e.g., ice cream or french fries).
Adbry Counseling: I discussed with the patient the risks of tralokinumab including but not limited to eye infection and irritation, cold sores, injection site reactions, worsening of asthma, allergic reactions and increased risk of parasitic infection.  Live vaccines should be avoided while taking tralokinumab. The patient understands that monitoring is required and they must alert us or the primary physician if symptoms of infection or other concerning signs are noted.
Fluconazole Counseling:  Patient counseled regarding adverse effects of fluconazole including but not limited to headache, diarrhea, nausea, upset stomach, liver function test abnormalities, taste disturbance, and stomach pain.  There is a rare possibility of liver failure that can occur when taking fluconazole.  The patient understands that monitoring of LFTs and kidney function test may be required, especially at baseline. The patient verbalized understanding of the proper use and possible adverse effects of fluconazole.  All of the patient's questions and concerns were addressed.
Hydroxyzine Pregnancy And Lactation Text: This medication is not safe during pregnancy and should not be taken. It is also excreted in breast milk and breast feeding isn't recommended.
Adbry Pregnancy And Lactation Text: It is unknown if this medication will adversely affect pregnancy or breast feeding.
Bimzelx Counseling:  I discussed with the patient the risks of Bimzelx including but not limited to depression, immunosuppression, allergic reactions and infections.  The patient understands that monitoring is required including a PPD at baseline and must alert us or the primary physician if symptoms of infection or other concerning signs are noted.
Bimzelx Pregnancy And Lactation Text: This medication crosses the placenta and the safety is uncertain during pregnancy. It is unknown if this medication is present in breast milk.
Dupixent Pregnancy And Lactation Text: This medication likely crosses the placenta but the risk for the fetus is uncertain. This medication is excreted in breast milk.
Cimzia Pregnancy And Lactation Text: This medication crosses the placenta but can be considered safe in certain situations. Cimzia may be excreted in breast milk.
Ebglyss Counseling: I discussed with the patient the risks of lebrikizumab including but not limited to eye inflammation and irritation, cold sores, injection site reactions, allergic reactions and increased risk of parasitic infection. The patient understands that monitoring is required and they must alert us or the primary physician if symptoms of infection or other concerning signs are noted.
Dupixent Counseling: I discussed with the patient the risks of dupilumab including but not limited to eye infection and irritation, cold sores, injection site reactions, worsening of asthma, allergic reactions and increased risk of parasitic infection.  Live vaccines should be avoided while taking dupilumab. Dupilumab will also interact with certain medications such as warfarin and cyclosporine. The patient understands that monitoring is required and they must alert us or the primary physician if symptoms of infection or other concerning signs are noted.
Cosentyx Counseling:  I discussed with the patient the risks of Cosentyx including but not limited to worsening of Crohn's disease, immunosuppression, allergic reactions and infections.  The patient understands that monitoring is required including a PPD at baseline and must alert us or the primary physician if symptoms of infection or other concerning signs are noted.
Cosentyx Pregnancy And Lactation Text: This medication is Pregnancy Category B and is considered safe during pregnancy. It is unknown if this medication is excreted in breast milk.
Cimzia Counseling:  I discussed with the patient the risks of Cimzia including but not limited to immunosuppression, allergic reactions and infections.  The patient understands that monitoring is required including a PPD at baseline and must alert us or the primary physician if symptoms of infection or other concerning signs are noted.
Ebglyss Pregnancy And Lactation Text: This medication likely crosses the placenta but the risk for the fetus is uncertain. It is unknown if this medication is excreted in breast milk.
Humira Counseling:  I discussed with the patient the risks of adalimumab including but not limited to myelosuppression, immunosuppression, autoimmune hepatitis, demyelinating diseases, lymphoma, and serious infections.  The patient understands that monitoring is required including a PPD at baseline and must alert us or the primary physician if symptoms of infection or other concerning signs are noted.
Enbrel Counseling:  I discussed with the patient the risks of etanercept including but not limited to myelosuppression, immunosuppression, autoimmune hepatitis, demyelinating diseases, lymphoma, and infections.  The patient understands that monitoring is required including a PPD at baseline and must alert us or the primary physician if symptoms of infection or other concerning signs are noted.
Hyrimoz Counseling:  I discussed with the patient the risks of adalimumab including but not limited to myelosuppression, immunosuppression, autoimmune hepatitis, demyelinating diseases, lymphoma, and serious infections.  The patient understands that monitoring is required including a PPD at baseline and must alert us or the primary physician if symptoms of infection or other concerning signs are noted.
Infliximab Counseling:  I discussed with the patient the risks of infliximab including but not limited to myelosuppression, immunosuppression, autoimmune hepatitis, demyelinating diseases, lymphoma, and serious infections.  The patient understands that monitoring is required including a PPD at baseline and must alert us or the primary physician if symptoms of infection or other concerning signs are noted.
Ilumya Pregnancy And Lactation Text: The risk during pregnancy and breastfeeding is uncertain with this medication.
Ilumya Counseling: I discussed with the patient the risks of tildrakizumab including but not limited to immunosuppression, malignancy, posterior leukoencephalopathy syndrome, and serious infections.  The patient understands that monitoring is required including a PPD at baseline and must alert us or the primary physician if symptoms of infection or other concerning signs are noted.
Nemluvio Counseling: I discussed with the patient the risks of nemolizumab including but not limited to headache, gastrointestinal complaints, nasopharyngitis, musculoskeletal complaints, injection site reactions, and allergic reactions. The patient understands that monitoring is required and they must alert us or the primary physician if any side effects are noted.
Nemluvio Pregnancy And Lactation Text: It is not known if Nemluvio causes fetal harm or is present in breast milk. Please proceed with caution if patients who are pregnant or breastfeeding.
Rituxan Counseling:  I discussed with the patient the risks of Rituxan infusions. Side effects can include infusion reactions, severe drug rashes including mucocutaneous reactions, reactivation of latent hepatitis and other infections and rarely progressive multifocal leukoencephalopathy.  All of the patient's questions and concerns were addressed.
Siliq Counseling:  I discussed with the patient the risks of Siliq including but not limited to new or worsening depression, suicidal thoughts and behavior, immunosuppression, malignancy, posterior leukoencephalopathy syndrome, and serious infections.  The patient understands that monitoring is required including a PPD at baseline and must alert us or the primary physician if symptoms of infection or other concerning signs are noted. There is also a special program designed to monitor depression which is required with Siliq.
Rituxan Pregnancy And Lactation Text: This medication is Pregnancy Category C and it isn't know if it is safe during pregnancy. It is unknown if this medication is excreted in breast milk but similar antibodies are known to be excreted.
Simlandi Counseling:  I discussed with the patient the risks of adalimumab including but not limited to myelosuppression, immunosuppression, autoimmune hepatitis, demyelinating diseases, lymphoma, and serious infections.  The patient understands that monitoring is required including a PPD at baseline and must alert us or the primary physician if symptoms of infection or other concerning signs are noted.
Skyrizi Counseling: I discussed with the patient the risks of risankizumab-rzaa including but not limited to immunosuppression, and serious infections.  The patient understands that monitoring is required including a PPD at baseline and must alert us or the primary physician if symptoms of infection or other concerning signs are noted.
Simponi Counseling:  I discussed with the patient the risks of golimumab including but not limited to myelosuppression, immunosuppression, autoimmune hepatitis, demyelinating diseases, lymphoma, and serious infections.  The patient understands that monitoring is required including a PPD at baseline and must alert us or the primary physician if symptoms of infection or other concerning signs are noted.
Spevigo Counseling: I discussed with the patient the risks of Spevigo including but not limited to fatigue, nasuea, vomiting, headache, pruritus, urinary tract infection, an infusion related reactions.  The patient understands that monitoring is required including screening for tuberculosis at baseline and yearly screening thereafter while continuing Spevigo therapy. They should contact us if symptoms of infection or other concerning signs are noted.
Spevigo Pregnancy And Lactation Text: The risk during pregnancy and breastfeeding is uncertain with this medication. This medication does cross the placenta. It is unknown if this medication is found in breast milk.
Tremfya Counseling: I discussed with the patient the risks of guselkumab including but not limited to immunosuppression, serious infections, worsening of inflammatory bowel disease and drug reactions.  The patient understands that monitoring is required including a PPD at baseline and must alert us or the primary physician if symptoms of infection or other concerning signs are noted.
Libtayo Counseling- I discussed with the patient the risks of Libtayo including but not limited to nausea, vomiting, diarrhea, and bone or muscle pain.  The patient verbalized understanding of the proper use and possible adverse effects of Libtayo.  All of the patient's questions and concerns were addressed.
Xolair Pregnancy And Lactation Text: This medication is Pregnancy Category B and is considered safe during pregnancy. This medication is excreted in breast milk.
Taltz Counseling: I discussed with the patient the risks of ixekizumab including but not limited to immunosuppression, serious infections, worsening of inflammatory bowel disease and drug reactions.  The patient understands that monitoring is required including a PPD at baseline and must alert us or the primary physician if symptoms of infection or other concerning signs are noted.
Erivedge Pregnancy And Lactation Text: This medication is Pregnancy Category X and is absolutely contraindicated during pregnancy. It is unknown if it is excreted in breast milk.
Erivedge Counseling- I discussed with the patient the risks of Erivedge including but not limited to nausea, vomiting, diarrhea, constipation, weight loss, changes in the sense of taste, decreased appetite, muscle spasms, and hair loss.  The patient verbalized understanding of the proper use and possible adverse effects of Erivedge.  All of the patient's questions and concerns were addressed.
Stelara Counseling:  I discussed with the patient the risks of ustekinumab including but not limited to immunosuppression, malignancy, posterior leukoencephalopathy syndrome, and serious infections.  The patient understands that monitoring is required including a PPD at baseline and must alert us or the primary physician if symptoms of infection or other concerning signs are noted.
Xolair Counseling:  Patient informed of potential adverse effects including but not limited to fever, muscle aches, rash and allergic reactions.  The patient verbalized understanding of the proper use and possible adverse effects of Xolair.  All of the patient's questions and concerns were addressed.
Libtayo Pregnancy And Lactation Text: This medication is contraindicated in pregnancy and when breast feeding.
Dutasteride Male Counseling: Dutasteride Counseling:  I discussed with the patient the risks of use of dutasteride including but not limited to decreased libido, decreased ejaculate volume, and gynecomastia. Women who can become pregnant should not handle medication.  All of the patient's questions and concerns were addressed.
Odomzo Counseling- I discussed with the patient the risks of Odomzo including but not limited to nausea, vomiting, diarrhea, constipation, weight loss, changes in the sense of taste, decreased appetite, muscle spasms, and hair loss.  The patient verbalized understanding of the proper use and possible adverse effects of Odomzo.  All of the patient's questions and concerns were addressed.
Dutasteride Female Counseling: Dutasteride Counseling:  I discussed with the patient the risks of use of dutasteride including but not limited to decreased libido and sexual dysfunction. Explained the teratogenic nature of the medication and stressed the importance of not getting pregnant during treatment. All of the patient's questions and concerns were addressed.
Dutasteride Pregnancy And Lactation Text: This medication is absolutely contraindicated in women, especially during pregnancy and breast feeding. Feminization of male fetuses is possible if taking while pregnant.
Finasteride Male Counseling: Finasteride Counseling:  I discussed with the patient the risks of use of finasteride including but not limited to decreased libido, decreased ejaculate volume, gynecomastia, and depression. Women should not handle medication.  All of the patient's questions and concerns were addressed.
Finasteride Female Counseling: Finasteride Counseling:  I discussed with the patient the risks of use of finasteride including but not limited to decreased libido and sexual dysfunction. Explained the teratogenic nature of the medication and stressed the importance of not getting pregnant during treatment. All of the patient's questions and concerns were addressed.
Finasteride Pregnancy And Lactation Text: This medication is absolutely contraindicated during pregnancy. It is unknown if it is excreted in breast milk.
Azathioprine Pregnancy And Lactation Text: This medication is Pregnancy Category D and isn't considered safe during pregnancy. It is unknown if this medication is excreted in breast milk.
Cellcept Counseling:  I discussed with the patient the risks of mycophenolate mofetil including but not limited to infection/immunosuppression, GI upset, hypokalemia, hypercholesterolemia, bone marrow suppression, lymphoproliferative disorders, malignancy, GI ulceration/bleed/perforation, colitis, interstitial lung disease, kidney failure, progressive multifocal leukoencephalopathy, and birth defects.  The patient understands that monitoring is required including a baseline creatinine and regular CBC testing. In addition, patient must alert us immediately if symptoms of infection or other concerning signs are noted.
Cyclophosphamide Counseling:  I discussed with the patient the risks of cyclophosphamide including but not limited to hair loss, hormonal abnormalities, decreased fertility, abdominal pain, diarrhea, nausea and vomiting, bone marrow suppression and infection. The patient understands that monitoring is required while taking this medication.
Birth Control Pills Counseling: Birth Control Pill Counseling: I discussed with the patient the potential side effects of OCPs including but not limited to increased risk of stroke, heart attack, thrombophlebitis, deep venous thrombosis, hepatic adenomas, breast changes, GI upset, headaches, and depression.  The patient verbalized understanding of the proper use and possible adverse effects of OCPs. All of the patient's questions and concerns were addressed.
Birth Control Pills Pregnancy And Lactation Text: This medication should be avoided if pregnant and for the first 30 days post-partum.
Cyclophosphamide Pregnancy And Lactation Text: This medication is Pregnancy Category D and it isn't considered safe during pregnancy. This medication is excreted in breast milk.
Cyclosporine Counseling:  I discussed with the patient the risks of cyclosporine including but not limited to hypertension, gingival hyperplasia,myelosuppression, immunosuppression, liver damage, kidney damage, neurotoxicity, lymphoma, and serious infections. The patient understands that monitoring is required including baseline blood pressure, CBC, CMP, lipid panel and uric acid, and then 1-2 times monthly CMP and blood pressure.
Cyclosporine Pregnancy And Lactation Text: This medication is Pregnancy Category C and it isn't know if it is safe during pregnancy. This medication is excreted in breast milk.
Cibinqo Counseling: I discussed with the patient the risks of Cibinqo therapy including but not limited to common cold, nausea, headache, cold sores, increased blood CPK levels, dizziness, UTIs, fatigue, acne, and vomitting. Live vaccines should be avoided.  This medication has been linked to serious infections; higher rate of mortality; malignancy and lymphoproliferative disorders; major adverse cardiovascular events; thrombosis; thrombocytopenia and lymphopenia; lipid elevations; and retinal detachment.
Azathioprine Counseling:  I discussed with the patient the risks of azathioprine including but not limited to myelosuppression, immunosuppression, hepatotoxicity, lymphoma, and infections.  The patient understands that monitoring is required including baseline LFTs, Creatinine, possible TPMP genotyping and weekly CBCs for the first month and then every 2 weeks thereafter.  The patient verbalized understanding of the proper use and possible adverse effects of azathioprine.  All of the patient's questions and concerns were addressed.
Spironolactone Counseling: Patient advised regarding risks of diarrhea, abdominal pain, hyperkalemia, birth defects (for female patients), liver toxicity and renal toxicity. The patient may need blood work to monitor liver and kidney function and potassium levels while on therapy. The patient verbalized understanding of the proper use and possible adverse effects of spironolactone.  All of the patient's questions and concerns were addressed.
Spironolactone Pregnancy And Lactation Text: This medication can cause feminization of the male fetus and should be avoided during pregnancy. The active metabolite is also found in breast milk.
Methotrexate Pregnancy And Lactation Text: This medication is Pregnancy Category X and is known to cause fetal harm. This medication is excreted in breast milk.
Methotrexate Counseling:  Patient counseled regarding adverse effects of methotrexate including but not limited to nausea, vomiting, abnormalities in liver function tests. Patients may develop mouth sores, rash, diarrhea, and abnormalities in blood counts. The patient understands that monitoring is required including LFT's and blood counts.  There is a rare possibility of scarring of the liver and lung problems that can occur when taking methotrexate. Persistent nausea, loss of appetite, pale stools, dark urine, cough, and shortness of breath should be reported immediately. Patient advised to discontinue methotrexate treatment at least three months before attempting to become pregnant.  I discussed the need for folate supplements while taking methotrexate.  These supplements can decrease side effects during methotrexate treatment. The patient verbalized understanding of the proper use and possible adverse effects of methotrexate.  All of the patient's questions and concerns were addressed.
Prednisone Counseling:  I discussed with the patient the risks of prolonged use of prednisone including but not limited to weight gain, insomnia, osteoporosis, mood changes, diabetes, susceptibility to infection, glaucoma and high blood pressure.  In cases where prednisone use is prolonged, patients should be monitored with blood pressure checks, serum glucose levels and an eye exam.  Additionally, the patient may need to be placed on GI prophylaxis, PCP prophylaxis, and calcium and vitamin D supplementation and/or a bisphosphonate.  The patient verbalized understanding of the proper use and the possible adverse effects of prednisone.  All of the patient's questions and concerns were addressed.
Cibinqo Pregnancy And Lactation Text: It is unknown if this medication will adversely affect pregnancy or breast feeding.  You should not take this medication if you are currently pregnant or planning a pregnancy or while breastfeeding.
Litfulo Counseling: I discussed with the patient the risks of Litfulo therapy including but not limited to upper respiratory tract infections, shingles, cold sores, and nausea. Live vaccines should be avoided.  This medication has been linked to serious infections; higher rate of mortality; malignancy and lymphoproliferative disorders; major adverse cardiovascular events; thrombosis; gastrointestinal perforations; neutropenia; lymphopenia; anemia; liver enzyme elevations; and lipid elevations.
Litfulo Pregnancy And Lactation Text: Based on animal studies, Lifulo may cause embryo-fetal harm when administered to pregnant women.  The medication should not be used in pregnancy.  Breastfeeding is not recommended during treatment.
Olumiant Counseling: I discussed with the patient the risks of Olumiant therapy including but not limited to upper respiratory tract infections, shingles, cold sores, and nausea. Live vaccines should be avoided.  This medication has been linked to serious infections; higher rate of mortality; malignancy and lymphoproliferative disorders; major adverse cardiovascular events; thrombosis; gastrointestinal perforations; neutropenia; lymphopenia; anemia; liver enzyme elevations; and lipid elevations.
Olumiant Pregnancy And Lactation Text: Based on animal studies, Olumiant may cause embryo-fetal harm when administered to pregnant women.  The medication should not be used in pregnancy.  Breastfeeding is not recommended during treatment.
Rinvoq Counseling: I discussed with the patient the risks of Rinvoq therapy including but not limited to upper respiratory tract infections, shingles, cold sores, bronchitis, nausea, cough, fever, acne, and headache. Live vaccines should be avoided.  This medication has been linked to serious infections; higher rate of mortality; malignancy and lymphoproliferative disorders; major adverse cardiovascular events; thrombosis; thrombocytopenia, anemia, and neutropenia; lipid elevations; liver enzyme elevations; and gastrointestinal perforations.
Rinvoq Pregnancy And Lactation Text: Based on animal studies, Rinvoq may cause embryo-fetal harm when administered to pregnant women.  The medication should not be used in pregnancy.  Breastfeeding is not recommended during treatment and for 6 days after the last dose.
Xeljanz Counseling: I discussed with the patient the risks of Xeljanz therapy including increased risk of infection, liver issues, headache, diarrhea, or cold symptoms. Live vaccines should be avoided. They were instructed to call if they have any problems.
Sotyktu Counseling:  I discussed the most common side effects of Sotyktu including: common cold, sore throat, sinus infections, cold sores, canker sores, folliculitis, and acne.  I also discussed more serious side effects of Sotyktu including but not limited to: serious allergic reactions; increased risk for infections such as TB; cancers such as lymphomas; rhabdomyolysis and elevated CPK; and elevated triglycerides and liver enzymes. 
Xelirenez Pregnancy And Lactation Text: This medication is Pregnancy Category D and is not considered safe during pregnancy.  The risk during breast feeding is also uncertain.
Sotyktu Pregnancy And Lactation Text: There is insufficient data to evaluate whether or not Sotyktu is safe to use during pregnancy.   It is not known if Sotyktu passes into breast milk and whether or not it is safe to use when breastfeeding.  
Bexarotene Pregnancy And Lactation Text: This medication is Pregnancy Category X and should not be given to women who are pregnant or may become pregnant. This medication should not be used if you are breast feeding.
Isotretinoin Counseling: Patient should get monthly blood tests, not donate blood, not drive at night if vision affected, not share medication, and not undergo elective surgery for 6 months after tx completed. Side effects reviewed, pt to contact office should one occur.
Acitretin Counseling:  I discussed with the patient the risks of acitretin including but not limited to hair loss, dry lips/skin/eyes, liver damage, hyperlipidemia, depression/suicidal ideation, photosensitivity.  Serious rare side effects can include but are not limited to pancreatitis, pseudotumor cerebri, bony changes, clot formation/stroke/heart attack.  Patient understands that alcohol is contraindicated since it can result in liver toxicity and significantly prolong the elimination of the drug by many years.
Acitretin Pregnancy And Lactation Text: This medication is Pregnancy Category X and should not be given to women who are pregnant or may become pregnant in the future. This medication is excreted in breast milk.
Isotretinoin Pregnancy And Lactation Text: This medication is Pregnancy Category X and is considered extremely dangerous during pregnancy. It is unknown if it is excreted in breast milk.
Bexarotene Counseling:  I discussed with the patient the risks of bexarotene including but not limited to hair loss, dry lips/skin/eyes, liver abnormalities, hyperlipidemia, pancreatitis, depression/suicidal ideation, photosensitivity, drug rash/allergic reactions, hypothyroidism, anemia, leukopenia, infection, cataracts, and teratogenicity.  Patient understands that they will need regular blood tests to check lipid profile, liver function tests, white blood cell count, thyroid function tests and pregnancy test if applicable.
High Dose Vitamin A Counseling: Side effects reviewed, pt to contact office should one occur.
High Dose Vitamin A Pregnancy And Lactation Text: High dose vitamin A therapy is contraindicated during pregnancy and breast feeding.
Ozempic Counseling: I reviewed the possible side effects including: thyroid tumors, kidney disease, gallbladder disease, abdominal pain, constipation, diarrhea, nausea, vomiting and pancreatitis. Do not take this medication if you have a history or family history of multiple endocrine neoplasia syndrome type 2. Side effects reviewed, pt to contact office should one occur.
Oxempic Pregnancy And Lactation Text: The fetal risk of this medication is unknown and taking while pregnant is not recommended. It is unknown if this medication is present in breast milk.
Saxenda Counseling: I reviewed the possible side effects including: thyroid tumors, kidney disease, gallbladder disease, abdominal pain, constipation, diarrhea, nausea, vomiting and pancreatitis. Do not take this medication if you have a history or family history of multiple endocrine neoplasia syndrome type 2. Side effects reviewed, pt to contact office should one occur.
Semaglutide Counseling: I reviewed the possible side effects including: thyroid tumors, kidney disease, gallbladder disease, abdominal pain, constipation, diarrhea, nausea, vomiting and pancreatitis. Do not take this medication if you have a history or family history of multiple endocrine neoplasia syndrome type 2. Side effects reviewed, pt to contact office should one occur.
Wegovy Counseling: I reviewed the possible side effects including: thyroid tumors, kidney disease, gallbladder disease, abdominal pain, constipation, diarrhea, nausea, vomiting and pancreatitis. Do not take this medication if you have a history or family history of multiple endocrine neoplasia syndrome type 2. Side effects reviewed, pt to contact office should one occur.
Zepbound Counseling: I reviewed the possible side effects including: thyroid tumors, kidney disease, gallbladder disease, abdominal pain, constipation, diarrhea, nausea, vomiting and pancreatitis. Do not take this medication if you have a history or family history of multiple endocrine neoplasia syndrome type 2. Side effects reviewed, pt to contact office should one occur.
Aklief counseling:  Patient advised to apply a pea-sized amount only at bedtime and wait 30 minutes after washing their face before applying.  If too drying, patient may add a non-comedogenic moisturizer.  The most commonly reported side effects including irritation, redness, scaling, dryness, stinging, burning, itching, and increased risk of sunburn.  The patient verbalized understanding of the proper use and possible adverse effects of retinoids.  All of the patient's questions and concerns were addressed.
Aklief Pregnancy And Lactation Text: It is unknown if this medication is safe to use during pregnancy.  It is unknown if this medication is excreted in breast milk.  Breastfeeding women should use the topical cream on the smallest area of the skin for the shortest time needed while breastfeeding.  Do not apply to nipple and areola.
Azelaic Acid Counseling: Patient counseled that medicine may cause skin irritation and to avoid applying near the eyes.  In the event of skin irritation, the patient was advised to reduce the amount of the drug applied or use it less frequently.   The patient verbalized understanding of the proper use and possible adverse effects of azelaic acid.  All of the patient's questions and concerns were addressed.
Azelaic Acid Pregnancy And Lactation Text: This medication is considered safe during pregnancy and breast feeding.
Carac Counseling:  I discussed with the patient the risks of Carac including but not limited to erythema, scaling, itching, weeping, crusting, and pain.
Carac Pregnancy And Lactation Text: This medication is Pregnancy Category X and contraindicated in pregnancy and in women who may become pregnant. It is unknown if this medication is excreted in breast milk.
Benzoyl Peroxide Pregnancy And Lactation Text: This medication is Pregnancy Category C. It is unknown if benzoyl peroxide is excreted in breast milk.
Benzoyl Peroxide Counseling: Patient counseled that medicine may cause skin irritation and bleach clothing.  In the event of skin irritation, the patient was advised to reduce the amount of the drug applied or use it less frequently.   The patient verbalized understanding of the proper use and possible adverse effects of benzoyl peroxide.  All of the patient's questions and concerns were addressed.
Calcipotriene Counseling:  I discussed with the patient the risks of calcipotriene including but not limited to erythema, scaling, itching, and irritation.
Calcipotriene Pregnancy And Lactation Text: The use of this medication during pregnancy or lactation is not recommended as there is insufficient data.
Cantharidin Counseling:  I discussed with the patient the risks of Cantharidin including but not limited to pain, redness, burning, itching, and blistering.
Cantharidin Pregnancy And Lactation Text: This medication has not been proven safe during pregnancy. It is unknown if this medication is excreted in breast milk.
Hydroquinone Counseling:  Patient advised that medication may result in skin irritation, lightening (hypopigmentation), dryness, and burning.  In the event of skin irritation, the patient was advised to reduce the amount of the drug applied or use it less frequently.  Rarely, spots that are treated with hydroquinone can become darker (pseudoochronosis).  Should this occur, patient instructed to stop medication and call the office. The patient verbalized understanding of the proper use and possible adverse effects of hydroquinone.  All of the patient's questions and concerns were addressed.
Eucrisa Pregnancy And Lactation Text: This medication has not been assigned a Pregnancy Risk Category but animal studies failed to show danger with the topical medication. It is unknown if the medication is excreted in breast milk.
Imiquimod Pregnancy And Lactation Text: This medication is Pregnancy Category C. It is unknown if this medication is excreted in breast milk.
Eucrisa Counseling: Patient may experience a mild burning sensation during topical application. Eucrisa is not approved in children less than 2 years of age.
5-Fu Counseling: 5-Fluorouracil Counseling:  I discussed with the patient the risks of 5-fluorouracil including but not limited to erythema, scaling, itching, weeping, crusting, and pain.
Elidel Counseling: Patient may experience a mild burning sensation during topical application. Elidel is not approved in children less than 2 years of age. There have been case reports of hematologic and skin malignancies in patients using topical calcineurin inhibitors although causality is questionable.
Klisyri Counseling:  I discussed with the patient the risks of Klisyri including but not limited to erythema, scaling, itching, weeping, crusting, and pain.
Drysol Counseling:  I discussed with the patient the risks of drysol/aluminum chloride including but not limited to skin rash, itching, irritation, burning.
Imiquimod Counseling:  I discussed with the patient the risks of imiquimod including but not limited to erythema, scaling, itching, weeping, crusting, and pain.  Patient understands that the inflammatory response to imiquimod is variable from person to person and was educated regarded proper titration schedule.  If flu-like symptoms develop, patient knows to discontinue the medication and contact us.
Klisyri Pregnancy And Lactation Text: It is unknown if this medication can harm a developing fetus or if it is excreted in breast milk.
Mirvaso Counseling: Mirvaso is a topical medication which can decrease superficial blood flow where applied. Side effects are uncommon and include stinging, redness and allergic reactions.
Minoxidil Counseling: Minoxidil is a topical medication which can increase blood flow where it is applied. It is uncertain how this medication increases hair growth. Side effects are uncommon and include stinging and allergic reactions.
Mirvaso Pregnancy And Lactation Text: This medication has not been assigned a Pregnancy Risk Category. It is unknown if the medication is excreted in breast milk.
Opzelura Counseling:  I discussed with the patient the risks of Opzelura including but not limited to nasopharngitis, bronchitis, ear infection, eosinophila, hives, diarrhea, folliculitis, tonsillitis, and rhinorrhea.  Taken orally, this medication has been linked to serious infections; higher rate of mortality; malignancy and lymphoproliferative disorders; major adverse cardiovascular events; thrombosis; thrombocytopenia, anemia, and neutropenia; and lipid elevations.
Opzelura Pregnancy And Lactation Text: There is insufficient data to evaluate drug-associated risk for major birth defects, miscarriage, or other adverse maternal or fetal outcomes.  There is a pregnancy registry that monitors pregnancy outcomes in pregnant persons exposed to the medication during pregnancy.  It is unknown if this medication is excreted in breast milk.  Do not breastfeed during treatment and for about 4 weeks after the last dose.
Protopic Pregnancy And Lactation Text: This medication is Pregnancy Category C. It is unknown if this medication is excreted in breast milk when applied topically.
Protopic Counseling: Patient may experience a mild burning sensation during topical application. Protopic is not approved in children less than 2 years of age. There have been case reports of hematologic and skin malignancies in patients using topical calcineurin inhibitors although causality is questionable.
Picato Counseling:  I discussed with the patient the risks of Picato including but not limited to erythema, scaling, itching, weeping, crusting, and pain.
Qbrexza Counseling:  I discussed with the patient the risks of Qbrexza including but not limited to headache, mydriasis, blurred vision, dry eyes, nasal dryness, dry mouth, dry throat, dry skin, urinary hesitation, and constipation.  Local skin reactions including erythema, burning, stinging, and itching can also occur.
Qbrexza Pregnancy And Lactation Text: There is no available data on Qbrexza use in pregnant women.  There is no available data on Qbrexza use in lactation.
Rhofade Counseling: Rhofade is a topical medication which can decrease superficial blood flow where applied. Side effects are uncommon and include stinging, redness and allergic reactions.
Soolantra Counseling: I discussed with the patients the risks of topial Soolantra. This is a medicine which decreases the number of mites and inflammation in the skin. You experience burning, stinging, eye irritation or allergic reactions.  Please call our office if you develop any problems from using this medication.
Solaraze Pregnancy And Lactation Text: This medication is Pregnancy Category B and is considered safe. There is some data to suggest avoiding during the third trimester. It is unknown if this medication is excreted in breast milk.
Solaraze Counseling:  I discussed with the patient the risks of Solaraze including but not limited to erythema, scaling, itching, weeping, crusting, and pain.
Soolantra Pregnancy And Lactation Text: This medication is Pregnancy Category C. This medication is considered safe during breast feeding.
Tazorac Pregnancy And Lactation Text: This medication is not safe during pregnancy. It is unknown if this medication is excreted in breast milk.
Tazorac Counseling:  Patient advised that medication is irritating and drying.  Patient may need to apply sparingly and wash off after an hour before eventually leaving it on overnight.  The patient verbalized understanding of the proper use and possible adverse effects of tazorac.  All of the patient's questions and concerns were addressed.
Topical Clindamycin Counseling: Patient counseled that this medication may cause skin irritation or allergic reactions.  In the event of skin irritation, the patient was advised to reduce the amount of the drug applied or use it less frequently.   The patient verbalized understanding of the proper use and possible adverse effects of clindamycin.  All of the patient's questions and concerns were addressed.
Topical Retinoid counseling:  Patient advised to apply a pea-sized amount only at bedtime and wait 30 minutes after washing their face before applying.  If too drying, patient may add a non-comedogenic moisturizer. The patient verbalized understanding of the proper use and possible adverse effects of retinoids.  All of the patient's questions and concerns were addressed.
Topical Clindamycin Pregnancy And Lactation Text: This medication is Pregnancy Category B and is considered safe during pregnancy. It is unknown if it is excreted in breast milk.
Topical Ketoconazole Counseling: Patient counseled that this medication may cause skin irritation or allergic reactions.  In the event of skin irritation, the patient was advised to reduce the amount of the drug applied or use it less frequently.   The patient verbalized understanding of the proper use and possible adverse effects of ketoconazole.  All of the patient's questions and concerns were addressed.
Topical Metronidazole Counseling: Metronidazole is a topical antibiotic medication. You may experience burning, stinging, redness, or allergic reactions.  Please call our office if you develop any problems from using this medication.
Topical Metronidazole Pregnancy And Lactation Text: This medication is Pregnancy Category B and considered safe during pregnancy.  It is also considered safe to use while breastfeeding.
Topical Steroids Counseling: I discussed with the patient that prolonged use of topical steroids can result in the increased appearance of superficial blood vessels (telangiectasias), lightening (hypopigmentation) and thinning of the skin (atrophy).  Patient understands to avoid using high potency steroids in skin folds, the groin or the face.  The patient verbalized understanding of the proper use and possible adverse effects of topical steroids.  All of the patient's questions and concerns were addressed.
Topical Steroids Applications Pregnancy And Lactation Text: Most topical steroids are considered safe to use during pregnancy and lactation.  Any topical steroid applied to the breast or nipple should be washed off before breastfeeding.
Winlevi Counseling:  I discussed with the patient the risks of topical clascoterone including but not limited to erythema, scaling, itching, and stinging. Patient voiced their understanding.
Topical Sulfur Applications Counseling: Topical Sulfur Counseling: Patient counseled that this medication may cause skin irritation or allergic reactions.  In the event of skin irritation, the patient was advised to reduce the amount of the drug applied or use it less frequently.   The patient verbalized understanding of the proper use and possible adverse effects of topical sulfur application.  All of the patient's questions and concerns were addressed.
Topical Sulfur Applications Pregnancy And Lactation Text: This medication is Pregnancy Category C and has an unknown safety profile during pregnancy. It is unknown if this topical medication is excreted in breast milk.
Wartpeel Counseling:  I discussed with the patient the risks of Wartpeel including but not limited to erythema, scaling, itching, weeping, crusting, and pain.
Winlevi Pregnancy And Lactation Text: This medication is considered safe during pregnancy and breastfeeding.
VTAMA Counseling: I discussed with the patient that VTAMA is not for use in the eyes, mouth or mouth. They should call the office if they develop any signs of allergic reactions to VTAMA. The patient verbalized understanding of the proper use and possible adverse effects of VTAMA.  All of the patient's questions and concerns were addressed.
Vtama Pregnancy And Lactation Text: It is unknown if this medication can cause problems during pregnancy and breastfeeding.
Zoryve Counseling:  I discussed with the patient that Zoryve is not for use in the eyes, mouth or vagina. The most commonly reported side effects include diarrhea, headache, insomnia, application site pain, upper respiratory tract infections, and urinary tract infections.  All of the patient's questions and concerns were addressed.
Opioid Counseling: I discussed with the patient the potential side effects of opioids including but not limited to addiction, altered mental status, and depression. I stressed avoiding alcohol, benzodiazepines, muscle relaxants and sleep aids unless specifically okayed by a physician. The patient verbalized understanding of the proper use and possible adverse effects of opioids. All of the patient's questions and concerns were addressed. They were instructed to flush the remaining pills down the toilet if they did not need them for pain.
Zyclara Counseling:  I discussed with the patient the risks of imiquimod including but not limited to erythema, scaling, itching, weeping, crusting, and pain.  Patient understands that the inflammatory response to imiquimod is variable from person to person and was educated regarded proper titration schedule.  If flu-like symptoms develop, patient knows to discontinue the medication and contact us.
Opioid Pregnancy And Lactation Text: These medications can lead to premature delivery and should be avoided during pregnancy. These medications are also present in breast milk in small amounts.
Colchicine Counseling:  Patient counseled regarding adverse effects including but not limited to stomach upset (nausea, vomiting, stomach pain, or diarrhea).  Patient instructed to limit alcohol consumption while taking this medication.  Colchicine may reduce blood counts especially with prolonged use.  The patient understands that monitoring of kidney function and blood counts may be required, especially at baseline. The patient verbalized understanding of the proper use and possible adverse effects of colchicine.  All of the patient's questions and concerns were addressed.
Dapsone Pregnancy And Lactation Text: This medication is Pregnancy Category C and is not considered safe during pregnancy or breast feeding.
Colchicine Pregnancy And Lactation Text: This medication is Pregnancy Category C and isn't considered safe during pregnancy. It is excreted in breast milk.
Arava Counseling:  Patient counseled regarding adverse effects of Arava including but not limited to nausea, vomiting, abnormalities in liver function tests. Patients may develop mouth sores, rash, diarrhea, and abnormalities in blood counts. The patient understands that monitoring is required including LFTs and blood counts.  There is a rare possibility of scarring of the liver and lung problems that can occur when taking methotrexate. Persistent nausea, loss of appetite, pale stools, dark urine, cough, and shortness of breath should be reported immediately. Patient advised to discontinue Arava treatment and consult with a physician prior to attempting conception. The patient will have to undergo a treatment to eliminate Arava from the body prior to conception.
Clofazimine Counseling:  I discussed with the patient the risks of clofazimine including but not limited to skin and eye pigmentation, liver damage, nausea/vomiting, gastrointestinal bleeding and allergy.
Hydroxychloroquine Pregnancy And Lactation Text: This medication has been shown to cause fetal harm but it isn't assigned a Pregnancy Risk Category. There are small amounts excreted in breast milk.
Low Dose Naltrexone Counseling- I discussed with the patient the potential risks and side effects of low dose naltrexone including but not limited to: more vivid dreams, headaches, nausea, vomiting, abdominal pain, fatigue, dizziness, and anxiety.
Hydroxychloroquine Counseling:  I discussed with the patient that a baseline ophthalmologic exam is needed at the start of therapy and every year thereafter while on therapy. A CBC may also be warranted for monitoring.  The side effects of this medication were discussed with the patient, including but not limited to agranulocytosis, aplastic anemia, seizures, rashes, retinopathy, and liver toxicity. Patient instructed to call the office should any adverse effect occur.  The patient verbalized understanding of the proper use and possible adverse effects of Plaquenil.  All the patient's questions and concerns were addressed.
Glycopyrrolate Pregnancy And Lactation Text: This medication is Pregnancy Category B and is considered safe during pregnancy. It is unknown if it is excreted breast milk.
Glycopyrrolate Counseling:  I discussed with the patient the risks of glycopyrrolate including but not limited to skin rash, drowsiness, dry mouth, difficulty urinating, and blurred vision.
Gabapentin Counseling: I discussed with the patient the risks of gabapentin including but not limited to dizziness, somnolence, fatigue and ataxia.
Dapsone Counseling: I discussed with the patient the risks of dapsone including but not limited to hemolytic anemia, agranulocytosis, rashes, methemoglobinemia, kidney failure, peripheral neuropathy, headaches, GI upset, and liver toxicity.  Patients who start dapsone require monitoring including baseline LFTs and weekly CBCs for the first month, then every month thereafter.  The patient verbalized understanding of the proper use and possible adverse effects of dapsone.  All of the patient's questions and concerns were addressed.
Low Dose Naltrexone Pregnancy And Lactation Text: Naltrexone is pregnancy category C.  There have been no adequate and well-controlled studies in pregnant women.  It should be used in pregnancy only if the potential benefit justifies the potential risk to the fetus.   Limited data indicates that naltrexone is minimally excreted into breastmilk.
Niacinamide Counseling: I recommended taking niacin or niacinamide, also know as vitamin B3, twice daily. Recent evidence suggests that taking vitamin B3 (500 mg twice daily) can reduce the risk of actinic keratoses and non-melanoma skin cancers. Side effects of vitamin B3 include flushing and headache.
Niacinamide Pregnancy And Lactation Text: These medications are considered safe during pregnancy.
Olanzapine Counseling- I discussed with the patient the common side effects of olanzapine including but are not limited to: lack of energy, dry mouth, increased appetite, sleepiness, tremor, constipation, dizziness, changes in behavior, or restlessness.  Explained that teenagers are more likely to experience headaches, abdominal pain, pain in the arms or legs, tiredness, and sleepiness.  Serious side effects include but are not limited: increased risk of death in elderly patients who are confused, have memory loss, or dementia-related psychosis; hyperglycemia; increased cholesterol and triglycerides; and weight gain.
Nsaids Pregnancy And Lactation Text: These medications are considered safe up to 30 weeks gestation. It is excreted in breast milk.
Nsaids Counseling: NSAID Counseling: I discussed with the patient that NSAIDs should be taken with food. Prolonged use of NSAIDs can result in the development of stomach ulcers.  Patient advised to stop taking NSAIDs if abdominal pain occurs.  The patient verbalized understanding of the proper use and possible adverse effects of NSAIDs.  All of the patient's questions and concerns were addressed.
Olanzapine Pregnancy And Lactation Text: This medication is pregnancy category C.   There are no adequate and well controlled trials with olanzapine in pregnant females.  Olanzapine should be used during pregnancy only if the potential benefit justifies the potential risk to the fetus.   In a study in lactating healthy women, olanzapine was excreted in breast milk.  It is recommended that women taking olanzapine should not breast feed.
Oral Minoxidil Counseling- I discussed with the patient the risks of oral minoxidil including but not limited to shortness of breath, swelling of the feet or ankles, dizziness, lightheadedness, unwanted hair growth and allergic reaction.  The patient verbalized understanding of the proper use and possible adverse effects of oral minoxidil.  All of the patient's questions and concerns were addressed.
Oral Minoxidil Pregnancy And Lactation Text: This medication should only be used when clearly needed if you are pregnant, attempting to become pregnant or breast feeding.
Otezla Counseling: The side effects of Otezla were discussed with the patient, including but not limited to worsening or new depression, weight loss, diarrhea, nausea, upper respiratory tract infection, and headache. Patient instructed to call the office should any adverse effect occur.  The patient verbalized understanding of the proper use and possible adverse effects of Otezla.  All the patient's questions and concerns were addressed.
Propranolol Counseling:  I discussed with the patient the risks of propranolol including but not limited to low heart rate, low blood pressure, low blood sugar, restlessness and increased cold sensitivity. They should call the office if they experience any of these side effects.
Oxybutynin Counseling:  I discussed with the patient the risks of oxybutynin including but not limited to skin rash, drowsiness, dry mouth, difficulty urinating, and blurred vision.
Otezla Pregnancy And Lactation Text: This medication is Pregnancy Category C and it isn't known if it is safe during pregnancy. It is unknown if it is excreted in breast milk.
Propranolol Pregnancy And Lactation Text: This medication is Pregnancy Category C and it isn't known if it is safe during pregnancy. It is excreted in breast milk.
SSKI Counseling:  I discussed with the patient the risks of SSKI including but not limited to thyroid abnormalities, metallic taste, GI upset, fever, headache, acne, arthralgias, paraesthesias, lymphadenopathy, easy bleeding, arrhythmias, and allergic reaction.
Sski Pregnancy And Lactation Text: This medication is Pregnancy Category D and isn't considered safe during pregnancy. It is excreted in breast milk.
Tranexamic Acid Counseling:  Patient advised of the small risk of bleeding problems with tranexamic acid. They were also instructed to call if they developed any nausea, vomiting or diarrhea. All of the patient's questions and concerns were addressed.
Thalidomide Counseling: I discussed with the patient the risks of thalidomide including but not limited to birth defects, anxiety, weakness, chest pain, dizziness, cough and severe allergy.
Tranexamic Acid Pregnancy And Lactation Text: It is unknown if this medication is safe during pregnancy or breast feeding.
Valtrex Pregnancy And Lactation Text: this medication is Pregnancy Category B and is considered safe during pregnancy. This medication is not directly found in breast milk but it's metabolite acyclovir is present.
Valtrex Counseling: I discussed with the patient the risks of valacyclovir including but not limited to kidney damage, nausea, vomiting and severe allergy.  The patient understands that if the infection seems to be worsening or is not improving, they are to call.